# Patient Record
Sex: MALE | Race: WHITE | NOT HISPANIC OR LATINO | Employment: FULL TIME | URBAN - METROPOLITAN AREA
[De-identification: names, ages, dates, MRNs, and addresses within clinical notes are randomized per-mention and may not be internally consistent; named-entity substitution may affect disease eponyms.]

---

## 2018-09-11 ENCOUNTER — CLINICAL SUPPORT (OUTPATIENT)
Dept: OCCUPATIONAL MEDICINE | Facility: CLINIC | Age: 58
End: 2018-09-11

## 2018-09-11 DIAGNOSIS — Z02.1 ENCOUNTER FOR PRE-EMPLOYMENT EXAMINATION: ICD-10-CM

## 2018-09-11 PROCEDURE — 80306 DRUG TEST PRSMV INSTRMNT: CPT | Mod: S$GLB,,, | Performed by: PREVENTIVE MEDICINE

## 2018-09-11 PROCEDURE — 99499 UNLISTED E&M SERVICE: CPT | Mod: S$GLB,,, | Performed by: PREVENTIVE MEDICINE

## 2019-07-19 ENCOUNTER — OCCUPATIONAL HEALTH (OUTPATIENT)
Dept: URGENT CARE | Facility: CLINIC | Age: 59
End: 2019-07-19

## 2019-07-19 DIAGNOSIS — Z02.4 ENCOUNTER FOR DEPARTMENT OF TRANSPORTATION (DOT) EXAMINATION FOR DRIVING LICENSE RENEWAL: Primary | ICD-10-CM

## 2019-07-19 LAB — BREATH ALCOHOL: YES

## 2019-07-19 PROCEDURE — 82075 ASSAY OF BREATH ETHANOL: CPT | Mod: S$GLB,,, | Performed by: NURSE PRACTITIONER

## 2019-07-19 PROCEDURE — 80306 DRUG TEST PRSMV INSTRMNT: CPT | Mod: S$GLB,,, | Performed by: NURSE PRACTITIONER

## 2019-07-19 PROCEDURE — 99499 UNLISTED E&M SERVICE: CPT | Mod: S$GLB,,, | Performed by: NURSE PRACTITIONER

## 2019-07-19 PROCEDURE — 82075 POCT BREATH ALCOHOL TEST: ICD-10-PCS | Mod: S$GLB,,, | Performed by: NURSE PRACTITIONER

## 2019-07-19 PROCEDURE — 80306 OOH DOT DRUG SCREEN: ICD-10-PCS | Mod: S$GLB,,, | Performed by: NURSE PRACTITIONER

## 2019-07-19 PROCEDURE — 99499 DOT PHYSICAL: ICD-10-PCS | Mod: S$GLB,,, | Performed by: NURSE PRACTITIONER

## 2019-12-17 ENCOUNTER — OCCUPATIONAL HEALTH (OUTPATIENT)
Dept: URGENT CARE | Facility: CLINIC | Age: 59
End: 2019-12-17

## 2019-12-17 DIAGNOSIS — Z02.83 ENCOUNTER FOR DRUG SCREENING: Primary | ICD-10-CM

## 2019-12-17 PROCEDURE — 80306 OOH DOT DRUG SCREEN: ICD-10-PCS | Mod: S$GLB,,, | Performed by: PREVENTIVE MEDICINE

## 2019-12-17 PROCEDURE — 80306 DRUG TEST PRSMV INSTRMNT: CPT | Mod: S$GLB,,, | Performed by: PREVENTIVE MEDICINE

## 2021-07-06 ENCOUNTER — OCCUPATIONAL HEALTH (OUTPATIENT)
Dept: URGENT CARE | Facility: CLINIC | Age: 61
End: 2021-07-06

## 2021-07-06 DIAGNOSIS — Z02.4 ENCOUNTER FOR DEPARTMENT OF TRANSPORTATION (DOT) EXAMINATION FOR DRIVING LICENSE RENEWAL: Primary | ICD-10-CM

## 2021-07-06 PROCEDURE — 80306 OOH DOT DRUG SCREEN: ICD-10-PCS | Mod: S$GLB,,, | Performed by: NURSE PRACTITIONER

## 2021-07-06 PROCEDURE — 80306 DRUG TEST PRSMV INSTRMNT: CPT | Mod: S$GLB,,, | Performed by: NURSE PRACTITIONER

## 2021-07-06 PROCEDURE — 99499 UNLISTED E&M SERVICE: CPT | Mod: S$GLB,,, | Performed by: NURSE PRACTITIONER

## 2021-07-06 PROCEDURE — 99499 DOT PHYSICAL: ICD-10-PCS | Mod: S$GLB,,, | Performed by: NURSE PRACTITIONER

## 2021-08-02 ENCOUNTER — OCCUPATIONAL HEALTH (OUTPATIENT)
Dept: URGENT CARE | Facility: CLINIC | Age: 61
End: 2021-08-02

## 2021-08-02 DIAGNOSIS — Z02.89 ENCOUNTER FOR EXAMINATION REQUIRED BY DEPARTMENT OF TRANSPORTATION (DOT): Primary | ICD-10-CM

## 2022-03-17 DIAGNOSIS — T14.90XA TRAUMA: Primary | ICD-10-CM

## 2022-03-17 DIAGNOSIS — S22.21XA FRACTURE OF MANUBRIUM, INITIAL ENCOUNTER FOR CLOSED FRACTURE: ICD-10-CM

## 2022-03-24 ENCOUNTER — CLINICAL SUPPORT (OUTPATIENT)
Dept: REHABILITATION | Facility: HOSPITAL | Age: 62
End: 2022-03-24
Payer: MEDICAID

## 2022-03-24 DIAGNOSIS — M53.84 DECREASED RANGE OF MOTION OF THORACIC SPINE: ICD-10-CM

## 2022-03-24 DIAGNOSIS — F40.298 FEAR OF PAIN: Primary | ICD-10-CM

## 2022-03-24 DIAGNOSIS — R29.3 POSTURE ABNORMALITY: ICD-10-CM

## 2022-03-24 DIAGNOSIS — T78.40XD HYPERSENSITIVITY, SUBSEQUENT ENCOUNTER: ICD-10-CM

## 2022-03-24 DIAGNOSIS — M79.18 MYOFASCIAL MUSCLE PAIN: ICD-10-CM

## 2022-03-24 DIAGNOSIS — R29.898 DECREASED RANGE OF MOTION OF NECK: ICD-10-CM

## 2022-03-24 PROCEDURE — 97162 PT EVAL MOD COMPLEX 30 MIN: CPT | Mod: PO

## 2022-03-24 NOTE — PROGRESS NOTES
"[facility]   Physical Therapy Initial Evaluation     Date: 3/24/2022   [facility]  Cl[facility] Number: [id number]    Therapy Diagnosis:   Encounter Diagnoses   Name Primary?    Posture abnormality     Fear of pain Yes    Decreased range of motion of neck     Decreased range of motion of thoracic spine     Myofascial muscle pain     Hypersensitivity, subsequent encounter      Physician: Sadie Nickerson MD    Physician Orders: PT Eval and Treat   Medical Diagnosis from Referral:   T14.90XA (ICD-10-CM) - Injury, unspecified, initial encounter   S22.21XA (ICD-10-CM) - Fracture of manubrium, initial encounter for closed fracture     Evaluation Date: 3/24/2022  Authorization Period Expiration: 3/28/2023  Plan of Care Expiration: 7/1/2022  Progress Note Due: 4/28/2022  Visit # / Visits authorized: 1/ 1   FOTO: 1/3    Precautions: Standard     Time In: 11:07 am  Time Out: 11:50 am  Total Appointment Time (timed & untimed codes): 43 minutes  **Patient reports that he was given instructions to not get under his truck flat bed. Cannot describe any other precautions provided by physician  **PT will be reaching out to referring providers in neurospinal discipline due to patient's lack of understanding of precautions.    SUBJECTIVE   Date of onset: 11/14/2022  History of current condition - Cheikh reports: he is a  who describes his injury as follows: He was working underneath his truck (which was positioned on a flat bed) in a sitting position when it fell on him, crushing his sternum and breaking his neck in 2 places. He reports that he was "underneath his truck for over 2 hours." He reports however that he does not remember a lot about the accident, and reports that "my head hasn't been right since." He reports that he has not worked since then, and has been confused and disoriented since this incident, including currently as well. He reports that he was given a cervical collar " "at the time of injury but he took it off because it was uncomfortable. He cannot remember when he self-discontinued his cervical collar. He currently denies any parasthesia, referred upper extremity pain, loss of , bowel and bladder disturbances, or history of such complaints.    Imaging, Radiographs: The known fractures involving the right superior articular facet of C7 and right inferior articular facet of C6 are not demonstrated on this radiograph. There is no instability seen on dynamic views.     Prior Therapy: None (*Patient reports that he was very afraid to begin therapy due to a history of a chiropractor "cracking his neck" when he was not prepared, and perseverates on this incident and a reason for extreme caution of physical therapy treatment)  Social History: Lives alone (Occasionally mentioning a significant other and their children living together)  Occupation: ; not currently working)  Prior Level of Function: Independent  Current Level of Function: Unable to work, notes he dresses himself with extreme caution and pain and refuses to move his head. He notes that he is driving at this time.    Pain:  Current 7/10, worst 10/10, best 7/10   Location: bilateral neck , torso  and trunk   Description: Aching, Dull, Burning, Throbbing and Grabbinges  Aggravating Factors: Bending and Dressing, ADL's, Driving but notes that even the smallest movement will "make my head feel that it's on fire."  Easing Factors: rest    Patients goals: To get back to work and to decrease pain.  Medical History:   No past medical history on file.    Surgical History:   Cheikh JAMES Chowdhuryjeaneth  has no past surgical history on file.    Medications:   Cheikh currently has no medications in their medication list.    Allergies:   Review of patient's allergies indicates:  Not on File   OBJECTIVE      Right Left   Biceps Brachii 2+           2+   Brachioradialis 2+           2+   Triceps Brachii 0          0   Patellar Tendon 2+    "       3+   Rayland's Tendon 2+          2+   (-) Hoffmans  (-) Gait Instability  (-) Inverted Supinator Sign    Sensation: Cheikh report normal and symmetrical sensation in bilateral dermatomal levels.     Right Left   Upper Trapezius (C2,3,4)     Shoulder Abduction (C5) 3-/5 (80 degrees) 3-/5 (85 degrees)   Elbow Flexion (C6) 3/5 (unable to hold against any external resistance) 4/5   Elbow Extension (C7) 4/5 (increased pain) 4/5   Wrist Flexion(C7) 4/5 4/5   Wrist Extension (C6) 3-/5 (limited range) 3-/5   Thumb Abduction (C8-T1) 4/5 4/5     Posture/Observation: Cheikh arrives to treatment incredibly guarded and unwilling to rotate his head, rather choosing to rotate his entire body. At rest, he demonstrates marked forward head positioning with upper cervical extension and lower cervical extension, significant kyphosis and abducted bilateral scapula (4.5 inches from spine) with significant depression. There is no evidence of clavicular dislocation or fracture.     Range of Motion/Strength:   CERVICAL AROM Pain/Dysfunction with Movement   Flexion (45-50 degrees) 25 degrees Yes   Extension 5 degrees Yes   Right side bending Not tested Yes   Left side bending Not tested Yes   Right rotation (90 degrees) 5 degrees Yes   Left rotation (90 degrees) 5 degrees Yes       Shoulder Right Left Pain/Dysfunction with Movement   AROM      Flexion (scapular plane)  90 degrees  85 degrees Prefers to raise and lower passively; considerable encouragement to raise and lower independently   Extension  Not tested  Not tested    Abduction  45 degrees  45 degrees Prefers to raise and lower passively; considerable encouragement to raise and lower independently   Adduction  Not tested  Not tested    Internal rotation (Combined Movements)  Not tested  Not tested    ER (Combined Motions) Not tested Not tested    ER at 0° abd  10 degrees  10 degrees Significant pain and guarding     Shoulder Right Left Pain/Dysfunction with Movement   PROM       Flexion (scapular plane) 3-/5  3-/5    Extension  Not tested  Not tested    Abduction  3-/5  3-/5    Adduction  Not tested  Not tested    Internal rotation @ 90 degrees  Not tested  Not tested    ER at 0° abd 3-/5 3-/5    ER at 45° abd  Not tested  Not tested    ER at 90° abd  Not tested  Not tested    **Unable to test patient due to considerable fear and guarding as well as unwillingness to move freely    Limitation/Restriction for FOTO Trunk/Ribs Survey    Therapist reviewed FOTO scores for Cheikh Resendez on 3/24/2022.   FOTO documents entered into [employer] - see Media section.    Limitation Score: 70%       TREATMENT     Total Treatment time (time-based codes) separate from Evaluation: 5 minutes      Cheikh received the treatments listed below:      therapeutic exercises to develop ROM, flexibility and posture for 5 minutes including:  Scapular Retraction, gradual range of motion with each set    PATIENT EDUCATION AND HOME EXERCISES     Education provided:   - -Findings of evaluation and examination, and affect of these on plan for treatment  -Prognosis and expectations  -Role of PT and team-centered care for patient  -Home exercise program and expectations of therapy  -[employer] [facility] and PT/PTA treatment    Written Home Exercises Provided: yes. Exercises were reviewed and Cheikh was able to demonstrate them prior to the end of the session.  Cheikh demonstrated fair  understanding of the education provided. See EMR under Patient Instructions for exercises provided during therapy sessions.    ASSESSMENT     Cheikh is a 61 y.o. male referred to outpatient Physical Therapy with a medical diagnosis of   T14.90XA (ICD-10-CM) - Injury, unspecified, initial encounter   S22.21XA (ICD-10-CM) - Fracture of manubrium, initial encounter for closed fracture   Patient presents with significant participation restrictions as a working , and as in independent community dweller due to limitations in bathing,  dressing, grooming, reaching overhead, working overhead, bending, lifting, carrying, with contributing factors such as marked fear and guarding, unwillingness to move, perseveration of a previous negative experience of therapeutic services, lack of understanding of condition, catastrophization, limited cervical range of motion, limited thoracic range of motion, limited shoulder and scapular range of motion, postural abnormalities and myofascial restrictions. Patient will likely benefit from extensive education and encouragement with each session to progress to prior status and ability to return to work.    Patient prognosis is Guarded.   Patient will benefit from skilled outpatient Physical Therapy to address the deficits stated above and in the chart below, provide patient /family education, and to maximize patientt's level of independence.     Plan of care discussed with patient: Yes  Patient's spiritual, cultural and educational needs considered and patient is agreeable to the plan of care and goals as stated below:     Anticipated Barriers for therapy: Compliance, Level of Understanding, Fear, Guarding, Catastrophizing Behavior    Medical Necessity is demonstrated by the following  History  Co-morbidities and personal factors that may impact the plan of care Co-morbidities:   advanced age, anxiety, coping style/mechanism, depression, difficulty sleeping, education level, financial considerations, level of undertstanding of current condition and poor medication/medical compliance    Personal Factors:   age  education level  coping style  social background  lifestyle  character  attitudes     high   Examination  Body Structures and Functions, activity limitations and participation restrictions that may impact the plan of care Body Regions:   head  neck  back  upper extremities  trunk    Body Systems:    gross symmetry  ROM  strength  gross coordinated movement  transfers  motor control  motor learning  Myofascial  Extensibility, Postural Abnormalities, Joint Accessory Mobility    Participation Restrictions:   Community Dweller      Activity limitations:   Learning and applying knowledge  no deficits    General Tasks and Commands  no deficits    Communication  no deficits    Mobility  lifting and carrying objects  fine hand use (grasping/picking up)  moving around using equipment (WC)  using transportation (bus, train, plane, car)  driving (bike, car, motorcycle)    Self care  washing oneself (bathing, drying, washing hands)  caring for body parts (brushing teeth, shaving, grooming)  toileting  dressing  eating  drinking  looking after one's health    Domestic Life  shopping  cooking  doing house work (cleaning house, washing dishes, laundry)  assisting others    Interactions/Relationships  no deficits    Life Areas  employment    Community and Social Life  community life  recreation and leisure  human rights         high   Clinical Presentation stable and uncomplicated low   Decision Making/ Complexity Score: Moderate     Goals:  Short Term Goals: 4 weeks   1.) Patient will demonstrate independence in compliance and technique of home exercise program provided as per teach-back method of assessment.  2.) Patient will demonstrate fair to good scapular control in repositioning into proper alignment without cueing.  3.) Patient will reach to 120 degrees of shoulder flexion bilaterally to facilitate range of motion for functional ADL performance.  4.) Patient will improve cervical range of motion by +10 degrees in all planes to improve ability to navigate environment.  5.) Patient will report improvement in FOTO score by <60% reported disability.    Long Term Goals: 8 weeks   1.) Patient will demonstrate independence in compliance and technique of home exercise program provided as per teach-back method of assessment.  2.) Patient will demonstrate full thoracic mobility and range of motion in all planes.  3.) Patient will  reach to 170 degrees of shoulder flexion bilaterally to facilitate range of motion for functional ADL performance.  4.) Patient will improve cervical range of motion by +20 degrees in all planes to improve ability to navigate environment.  5.) Patient will report improvement in FOTO score by <40% reported disability.  6.) Patient will lift 5# item overhead for 5x trials with good body mechanics, no fear or guarding and with <3/10 pain.    PLAN   Plan of care Certification: 3/24/2022 to 7/1/2022.    Outpatient Physical Therapy 2 times weekly for 8 weeks to include the following interventions: Manual Therapy, Moist Heat/ Ice, Neuromuscular Re-ed, Patient Education, Self Care, Therapeutic Activities and Therapeutic Exercise.     **PT will be reaching out to referring providers in neurospinal discipline due to patient's lack of understanding of precautions.    Jena Munguia PT, DPT, OCS  License Number: [id number]      I CERTIFY THE NEED FOR THESE SERVICES FURNISHED UNDER THIS PLAN OF TREATMENT AND WHILE UNDER MY CARE   Physician's comments:     Physician's Signature: ___________________________________________________    2-4 weeks

## 2022-03-27 PROBLEM — R29.3 POSTURE ABNORMALITY: Status: ACTIVE | Noted: 2022-03-27
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
## 2022-03-28 ENCOUNTER — DOCUMENTATION ONLY (OUTPATIENT)
Dept: REHABILITATION | Facility: HOSPITAL | Age: 62
End: 2022-03-28
Payer: MEDICAID

## 2022-03-28 NOTE — PROGRESS NOTES
PT called patient after patient had not arrived for 15 minutes after visit was scheduled to begin.   Communication: Spoke with patient, who stated that he had called to cancel due to conflicting appointment and was unable to get through.   Next appointment: 3/29/2022  Patient Reminded/Confirmed: Patient's appointment re-scheduled for this time, patient confirms.  Number of No-Shows To Date: 0  Number of Same-Day Cancels To Date: 1      Jena Munguia PT,DPT, OCS  License Number: [id number]

## 2022-03-29 ENCOUNTER — CLINICAL SUPPORT (OUTPATIENT)
Dept: REHABILITATION | Facility: HOSPITAL | Age: 62
End: 2022-03-29
Payer: MEDICAID

## 2022-03-29 DIAGNOSIS — R29.3 POSTURE ABNORMALITY: Primary | ICD-10-CM

## 2022-03-29 PROCEDURE — 97110 THERAPEUTIC EXERCISES: CPT | Mod: PO
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
## 2022-03-30 NOTE — PROGRESS NOTES
"  Physical Therapy Daily Treatment Note     Name: Cheikh RAMIREZ [facility] Number: [id number]    Therapy Diagnosis:   Encounter Diagnosis   Name Primary?    Posture abnormality Yes     Physician: Sadie Nickerson MD    Visit Date: 3/31/2022  Physician Orders: PT Eval and Treat   Medical Diagnosis from Referral:   T14.90XA (ICD-10-CM) - Injury, unspecified, initial encounter   S22.21XA (ICD-10-CM) - Fracture of manubrium, initial encounter for closed fracture      Evaluation Date: 3/24/2022  Authorization Period Expiration: 6/26/2022  Plan of Care Expiration: 7/1/2022  Progress Note Due: 4/28/2022  Visit # / Visits authorized: 2/14  FOTO: 1/3    Time In: 929am  Time Out: 1014am  Total Billable Time: 44 minutes    Precautions: Standard     Discussed cervical CT results with Dr. Carlos, chief NSGY resident. Patient facet fx healed. Ok to remove c-collar. No follow ups needed at this time. Can return if symptoms change or fail to improve.     Subjective   History of current condition - Cheikh reports: he is a  who describes his injury as follows: He was working underneath his truck (which was positioned on a flat bed) in a sitting position when it fell on him, crushing his sternum and breaking his neck in 2 places. He reports that he was "underneath his truck for over 2 hours." He reports however that he does not remember a lot about the accident, and reports that "my head hasn't been right since." He reports that he has not worked since then, and has been confused and disoriented since this incident, including currently as well. He reports that he was given a cervical collar at the time of injury but he took it off because it was uncomfortable. He cannot remember when he self-discontinued his cervical collar. He currently denies any parasthesia, referred upper extremity pain, loss of , bowel and bladder disturbances, or history of such complaints.     Pt reports: that overall he feels like his mobility and " "pain are both improving. He is doing his exercises at home and feels less stiff after doing them. He still has fear of moving his neck too far.   He was compliant with home exercise program.  Response to previous treatment: Initial evaluation  Functional change: Ongoing    Pain: 9/10 (Despite high levels of pain, patient is able to participate and is in no acute distress)  Location: Bilateral neck    Objective     Cheikh received therapeutic exercises to develop strength, endurance, ROM, flexibility and posture for 30 minutes including:    -Chin-Tucks/Curls, 10x5" hold  -Chin Curl + Chest Press with 0# Juma, 3x8  -Supine Cervical Rotation, gentle, 15x each side  -Scapular Retraction, supine, 20x  -Serratus wall slides within comfortable ROM, 15x  -NuStep, Level 1, 7 minutes, with hot pack on cervical spine while performing    Cheikh received the following manual therapy techniques: Soft tissue Mobilization were applied  for 15 minutes, including:    Upper cervical flexion mobs, grade II  Manually-Assisted Pectoralis Minor Stretch, bilateral, 3x30" hold  Soft-Tissue Massage to (B) Sternocleidomastoid, Longissimus Cervicis/Capitus, Upper Trapezius, Levator Scapulae  Sub-Occipital Release    Cheikh received hot pack for 10 minutes to cervical spine during exercise.    Home Exercises Provided and Patient Education Provided     Education provided:   - Home exercise program  Written Home Exercises Provided: yes.  Exercises were reviewed and Cheikh was able to demonstrate them prior to the end of the session.  Cheikh demonstrated fair  understanding of the education provided.     See EMR under Patient Instructions for exercises provided 3/28/2022.    Assessment     Pt presents to PT with continues reports of pain and stiffness but overall seems to be feeling better since initiating physical therapy. He is tolerating manual interventions well so some gentle upper cervical spine mobilizations were applied today with good " tolerance. He does remain apprehensive with movements of the cervical spine and shoulders but he willing to complete his exercises following education. He was able to achieve about 130 degrees of shoulder flexion during wall slides with no reports of discomfort. Cardiovascular endurance training continues for pain control and improving his overall fitness.     Cheikh Is progressing well towards his goals.   Pt prognosis is Fair.     Pt will continue to benefit from skilled outpatient physical therapy to address the deficits listed in the problem list box on initial evaluation, provide pt/family education and to maximize pt's level of independence in the home and community environment.     Pt's spiritual, cultural and educational needs considered and pt agreeable to plan of care and goals.     Anticipated barriers to physical therapy: Compliance, Level of Understanding, Fear, Guarding, Catastrophizing Behavior    Goals: Short Term Goals: 4 weeks   1.) Patient will demonstrate independence in compliance and technique of home exercise program provided as per teach-back method of assessment.  2.) Patient will demonstrate fair to good scapular control in repositioning into proper alignment without cueing.  3.) Patient will reach to 120 degrees of shoulder flexion bilaterally to facilitate range of motion for functional ADL performance.  4.) Patient will improve cervical range of motion by +10 degrees in all planes to improve ability to navigate environment.  5.) Patient will report improvement in FOTO score by <60% reported disability.     Long Term Goals: 8 weeks   1.) Patient will demonstrate independence in compliance and technique of home exercise program provided as per teach-back method of assessment.  2.) Patient will demonstrate full thoracic mobility and range of motion in all planes.  3.) Patient will reach to 170 degrees of shoulder flexion bilaterally to facilitate range of motion for functional ADL  performance.  4.) Patient will improve cervical range of motion by +20 degrees in all planes to improve ability to navigate environment.  5.) Patient will report improvement in FOTO score by <40% reported disability.  6.) Patient will lift 5# item overhead for 5x trials with good body mechanics, no fear or guarding and with <3/10 pain.  Plan     Continue to progress as per precautions and as per plan of care.  **PT will be reaching out to referring providers in neurospinal discipline due to patient's lack of understanding of precautions.    Terry Saez PT, DPT

## 2022-03-31 ENCOUNTER — CLINICAL SUPPORT (OUTPATIENT)
Dept: REHABILITATION | Facility: HOSPITAL | Age: 62
End: 2022-03-31
Payer: MEDICAID

## 2022-03-31 DIAGNOSIS — R29.3 POSTURE ABNORMALITY: Primary | ICD-10-CM

## 2022-03-31 PROCEDURE — 97110 THERAPEUTIC EXERCISES: CPT | Mod: PO

## 2022-04-04 ENCOUNTER — CLINICAL SUPPORT (OUTPATIENT)
Dept: REHABILITATION | Facility: HOSPITAL | Age: 62
End: 2022-04-04
Payer: MEDICAID

## 2022-04-04 DIAGNOSIS — R29.3 POSTURE ABNORMALITY: Primary | ICD-10-CM

## 2022-04-04 PROCEDURE — 97110 THERAPEUTIC EXERCISES: CPT | Mod: PO

## 2022-04-04 NOTE — PROGRESS NOTES
"  Physical Therapy Daily Treatment Note     [id number]    Therapy Diagnosis:   Encounter Diagnosis   Name Primary?    Posture abnormality Yes     Physician: Sadie Nickerson MD    Visit Date: 4/4/2022  Physician Orders: PT Eval and Treat   Medical Diagnosis from Referral:   T14.90XA (ICD-10-CM) - Injury, unspecified, initial encounter   S22.21XA (ICD-10-CM) - Fracture of manubrium, initial encounter for closed fracture      Evaluation Date: 3/24/2022  Authorization Period Expiration: 6/26/2022  Plan of Care Expiration: 7/1/2022  Progress Note Due: 4/28/2022  Visit # / Visits authorized: 3/14  FOTO: 1/3  Time In: 10:50 am  Time Out: 11:30 am  Total Billable Time: 40 minutes  Precautions: Standard   Discussed cervical CT results with Dr. Carlos, chief NSGY resident. Patient facet fx healed. Ok to remove c-collar. No follow ups needed at this time. Can return if symptoms change or fail to improve.   Subjective   History of current condition - Cheikh reports: he is a  who describes his injury as follows: He was working underneath his truck (which was positioned on a flat bed) in a sitting position when it fell on him, crushing his sternum and breaking his neck in 2 places. He reports that he was "underneath his truck for over 2 hours." He reports however that he does not remember a lot about the accident, and reports that "my head hasn't been right since." He reports that he has not worked since then, and has been confused and disoriented since this incident, including currently as well. He reports that he was given a cervical collar at the time of injury but he took it off because it was uncomfortable. He cannot remember when he self-discontinued his cervical collar. He currently denies any parasthesia, referred upper extremity pain, loss of , bowel and bladder disturbances, or history of such complaints.     Pt reports: he took a muscle relaxer right before our session " "today. He feels that his neck is getting better and he is able to move a little bit more since starting therapy.  He was compliant with home exercise program.  Response to previous treatment: Initial evaluation  Functional change: Ongoing  Pain: 8/10 (Despite high levels of pain, patient is able to participate and is in no acute distress)  Location: Bilateral neck    Objective     Cheikh received therapeutic exercises to develop strength, endurance, ROM, flexibility and posture for 30 minutes including:  -Chin-Tucks/Curls, 20x5" standing, towel behind head for positioning  -Supine Cervical Rotation, gentle, 15x each side  -Scapular Retraction, standing at wall, 2x15, orange band  -Serratus Wall Slides within comfortable ROM, 15x  -NuStep, Level 1, 7 minutes, with hot pack on cervical spine while performing  -Pulleys, scaption, 2 minutes  -Open Books, range of motion to tolerance, 15x (right rotation this visit)    Cheikh received the following manual therapy techniques: Soft tissue Mobilization were applied  for 15 minutes, including:  Upper cervical flexion mobs, grade II  Soft-Tissue Massage to (B) Sternocleidomastoid, Longissimus Cervicis/Capitus, Upper Trapezius, Levator Scapulae   Sub-Occipital Release  Cheikh received hot pack for 7 minutes to cervical spine during exercise.  Home Exercises Provided and Patient Education Provided     Education provided:   - Home exercise program  Written Home Exercises Provided: yes.  Exercises were reviewed and Cheikh was able to demonstrate them prior to the end of the session.  Cheikh demonstrated fair  understanding of the education provided.     See EMR under Patient Instructions for exercises provided 3/28/2022.    Assessment   Cheikh demonstrates a greater willingness to move this visit, with ability to raise arms independently and actively to greater than 90 degrees, and with less caution. Progressed to standing for increased functional tolerance, and patient has difficulty " in scapular retraction with good technique. Tolerated additional exercise well without increased pain.     Cheikh Is progressing well towards his goals.   Pt prognosis is Fair.     Pt will continue to benefit from skilled outpatient physical therapy to address the deficits listed in the problem list box on initial evaluation, provide pt/family education and to maximize pt's level of independence in the home and community environment.     Pt's spiritual, cultural and educational needs considered and pt agreeable to plan of care and goals.     Anticipated barriers to physical therapy: Compliance, Level of Understanding, Fear, Guarding, Catastrophizing Behavior    Goals: Short Term Goals: 4 weeks   1.) Patient will demonstrate independence in compliance and technique of home exercise program provided as per teach-back method of assessment.  2.) Patient will demonstrate fair to good scapular control in repositioning into proper alignment without cueing.  3.) Patient will reach to 120 degrees of shoulder flexion bilaterally to facilitate range of motion for functional ADL performance.  4.) Patient will improve cervical range of motion by +10 degrees in all planes to improve ability to navigate environment.  5.) Patient will report improvement in FOTO score by <60% reported disability.     Long Term Goals: 8 weeks   1.) Patient will demonstrate independence in compliance and technique of home exercise program provided as per teach-back method of assessment.  2.) Patient will demonstrate full thoracic mobility and range of motion in all planes.  3.) Patient will reach to 170 degrees of shoulder flexion bilaterally to facilitate range of motion for functional ADL performance.  4.) Patient will improve cervical range of motion by +20 degrees in all planes to improve ability to navigate environment.  5.) Patient will report improvement in FOTO score by <40% reported disability.  6.) Patient will lift 5# item overhead for 5x  trials with good body mechanics, no fear or guarding and with <3/10 pain.  Plan     Continue to progress as per precautions and as per plan of care.  **PT will be reaching out to referring providers in neurospinal discipline due to patient's lack of understanding of precautions.    Jena Munguia PT, DPT, OCS  License Number: [id number]

## 2022-04-06 ENCOUNTER — TELEPHONE (OUTPATIENT)
Dept: SPORTS MEDICINE | Facility: CLINIC | Age: 62
End: 2022-04-06
Payer: MEDICAID

## 2022-04-06 ENCOUNTER — CLINICAL SUPPORT (OUTPATIENT)
Dept: REHABILITATION | Facility: HOSPITAL | Age: 62
End: 2022-04-06
Payer: MEDICAID

## 2022-04-06 DIAGNOSIS — M25.511 BILATERAL SHOULDER PAIN, UNSPECIFIED CHRONICITY: Primary | ICD-10-CM

## 2022-04-06 DIAGNOSIS — R29.3 POSTURE ABNORMALITY: Primary | ICD-10-CM

## 2022-04-06 DIAGNOSIS — M25.512 BILATERAL SHOULDER PAIN, UNSPECIFIED CHRONICITY: Primary | ICD-10-CM

## 2022-04-06 PROCEDURE — 97110 THERAPEUTIC EXERCISES: CPT | Mod: PO

## 2022-04-06 NOTE — PROGRESS NOTES
"  Physical Therapy Daily Treatment Note     Name: Cheikh RAMIREZ [facility] Number: [id number]    Therapy Diagnosis:   Encounter Diagnosis   Name Primary?    Posture abnormality Yes     Physician: Sadie Nickerson MD    Visit Date: 4/6/2022  Physician Orders: PT Eval and Treat   Medical Diagnosis from Referral:   T14.90XA (ICD-10-CM) - Injury, unspecified, initial encounter   S22.21XA (ICD-10-CM) - Fracture of manubrium, initial encounter for closed fracture      Evaluation Date: 3/24/2022  Authorization Period Expiration: 6/26/2022  Plan of Care Expiration: 7/1/2022  Progress Note Due: 4/28/2022  Visit # / Visits authorized: 4/14  FOTO: 1/3  Time In: 12:20 pm  Time Out: 1:00 pm  Total Billable Time: 40 minutes  Precautions: Standard   Discussed cervical CT results with Dr. Carlos, chief NSGY resident. Patient facet fx healed. Ok to remove c-collar. No follow ups needed at this time. Can return if symptoms change or fail to improve.   Subjective   History of current condition - Cheikh reports: he is a  who describes his injury as follows: He was working underneath his truck (which was positioned on a flat bed) in a sitting position when it fell on him, crushing his sternum and breaking his neck in 2 places. He reports that he was "underneath his truck for over 2 hours." He reports however that he does not remember a lot about the accident, and reports that "my head hasn't been right since." He reports that he has not worked since then, and has been confused and disoriented since this incident, including currently as well. He reports that he was given a cervical collar at the time of injury but he took it off because it was uncomfortable. He cannot remember when he self-discontinued his cervical collar. He currently denies any parasthesia, referred upper extremity pain, loss of , bowel and bladder disturbances, or history of such complaints.     Pt reports: he took a muscle relaxer and a pain pill right " "before our session today, stating that his neck and back are irritated today, possibly from "sleeping on it wrong". He was compliant with home exercise program.  Response to previous treatment: Initial evaluation  Functional change: Ongoing  Pain: 8/10 (Despite high levels of pain, patient is able to participate and is in no acute distress)  Location: Bilateral neck    Objective   Cervical Rotation:   Right: 35 degrees (45 degrees post-manual therapy)   Left: 40 degrees  (55 degrees post-manual therapy)    Cheikh received therapeutic exercises to develop strength, endurance, ROM, flexibility and posture for 15 minutes including:  UBE, standing, Level 1, 2 minutes forward, 2 minutes backward  Prone Scapular Retraction, 2x10, 5" hold  -Chin-Tucks/Curls, 20x5" standing, towel behind head for positioning  -Open Books, range of motion to tolerance, 15x (bilateral)  -Mid-Trapezius Rows, yellow band, 3x10    Cheikh received the following manual therapy techniques: Soft tissue Mobilization were applied  for 25 minutes, including:  Upper Cervical (C1-C2) MET's for rotation; 5x5" holds; bilateral  Prone Thoracic CPA Mobilizations, Grade III, T2-T9    Cheikh received hot pack for 7 minutes to cervical spine during exercise.  Home Exercises Provided and Patient Education Provided     Education provided:   - Home exercise program  Written Home Exercises Provided: yes.  Exercises were reviewed and Cheikh was able to demonstrate them prior to the end of the session.  Cheikh demonstrated fair  understanding of the education provided.     See EMR under Patient Instructions for exercises provided 3/28/2022.    Assessment   Cheikh continues to remain very slow and guarded in his transitions this visit. He demonstrates 35 degrees of right rotation and 40 degrees of left rotation upon arrival, and manual therapy for upper cervical spine commenced to improve initial cervical rotation, which improved by +10 degrees in both directions following " MET's bilaterally. Thoracic hypomobility and thoracic kyphosis noted this visit and was also addressed, albeit no significant improvement with cervical rotation after this visit.    Cheikh Is progressing well towards his goals.   Pt prognosis is Fair.     Pt will continue to benefit from skilled outpatient physical therapy to address the deficits listed in the problem list box on initial evaluation, provide pt/family education and to maximize pt's level of independence in the home and community environment.     Pt's spiritual, cultural and educational needs considered and pt agreeable to plan of care and goals.     Anticipated barriers to physical therapy: Compliance, Level of Understanding, Fear, Guarding, Catastrophizing Behavior    Goals: Short Term Goals: 4 weeks   1.) Patient will demonstrate independence in compliance and technique of home exercise program provided as per teach-back method of assessment.  2.) Patient will demonstrate fair to good scapular control in repositioning into proper alignment without cueing.  3.) Patient will reach to 120 degrees of shoulder flexion bilaterally to facilitate range of motion for functional ADL performance.  4.) Patient will improve cervical range of motion by +10 degrees in all planes to improve ability to navigate environment.  5.) Patient will report improvement in FOTO score by <60% reported disability.     Long Term Goals: 8 weeks   1.) Patient will demonstrate independence in compliance and technique of home exercise program provided as per teach-back method of assessment.  2.) Patient will demonstrate full thoracic mobility and range of motion in all planes.  3.) Patient will reach to 170 degrees of shoulder flexion bilaterally to facilitate range of motion for functional ADL performance.  4.) Patient will improve cervical range of motion by +20 degrees in all planes to improve ability to navigate environment.  5.) Patient will report improvement in FOTO score by  <40% reported disability.  6.) Patient will lift 5# item overhead for 5x trials with good body mechanics, no fear or guarding and with <3/10 pain.  Plan     Continue to progress as per precautions and as per plan of care.  **PT will be reaching out to referring providers in neurospinal discipline due to patient's lack of understanding of precautions.    Jena Munguia PT, DPT, OCS  License Number: [id number]

## 2022-04-06 NOTE — TELEPHONE ENCOUNTER
Called pt to see which joint laterality it was and to let him know he needs to come early to get xrays done before his appt.

## 2022-04-08 ENCOUNTER — OFFICE VISIT (OUTPATIENT)
Dept: SPORTS MEDICINE | Facility: CLINIC | Age: 62
End: 2022-04-08
Payer: MEDICAID

## 2022-04-08 VITALS
HEIGHT: 69 IN | DIASTOLIC BLOOD PRESSURE: 91 MMHG | BODY MASS INDEX: 28.29 KG/M2 | SYSTOLIC BLOOD PRESSURE: 157 MMHG | WEIGHT: 191 LBS | HEART RATE: 78 BPM

## 2022-04-08 DIAGNOSIS — M25.511 ACUTE PAIN OF RIGHT SHOULDER: ICD-10-CM

## 2022-04-08 DIAGNOSIS — S49.91XA TRAUMATIC INJURY OF RIGHT SHOULDER: Primary | ICD-10-CM

## 2022-04-08 DIAGNOSIS — Z98.890 S/P RIGHT ROTATOR CUFF REPAIR: ICD-10-CM

## 2022-04-08 PROCEDURE — 3077F SYST BP >= 140 MM HG: CPT | Mod: CPTII,,, | Performed by: ORTHOPAEDIC SURGERY

## 2022-04-08 PROCEDURE — 3080F PR MOST RECENT DIASTOLIC BLOOD PRESSURE >= 90 MM HG: ICD-10-PCS | Mod: CPTII,,, | Performed by: ORTHOPAEDIC SURGERY

## 2022-04-08 PROCEDURE — 1159F PR MEDICATION LIST DOCUMENTED IN MEDICAL RECORD: ICD-10-PCS | Mod: CPTII,,, | Performed by: ORTHOPAEDIC SURGERY

## 2022-04-08 PROCEDURE — 99999 PR PBB SHADOW E&M-EST. PATIENT-LVL IV: CPT | Mod: PBBFAC,,, | Performed by: ORTHOPAEDIC SURGERY

## 2022-04-08 PROCEDURE — 3008F PR BODY MASS INDEX (BMI) DOCUMENTED: ICD-10-PCS | Mod: CPTII,,, | Performed by: ORTHOPAEDIC SURGERY

## 2022-04-08 PROCEDURE — 99204 PR OFFICE/OUTPT VISIT, NEW, LEVL IV, 45-59 MIN: ICD-10-PCS | Mod: S$PBB,,, | Performed by: ORTHOPAEDIC SURGERY

## 2022-04-08 PROCEDURE — 99204 OFFICE O/P NEW MOD 45 MIN: CPT | Mod: S$PBB,,, | Performed by: ORTHOPAEDIC SURGERY

## 2022-04-08 PROCEDURE — 3077F PR MOST RECENT SYSTOLIC BLOOD PRESSURE >= 140 MM HG: ICD-10-PCS | Mod: CPTII,,, | Performed by: ORTHOPAEDIC SURGERY

## 2022-04-08 PROCEDURE — 1159F MED LIST DOCD IN RCRD: CPT | Mod: CPTII,,, | Performed by: ORTHOPAEDIC SURGERY

## 2022-04-08 PROCEDURE — 3080F DIAST BP >= 90 MM HG: CPT | Mod: CPTII,,, | Performed by: ORTHOPAEDIC SURGERY

## 2022-04-08 PROCEDURE — 4010F ACE/ARB THERAPY RXD/TAKEN: CPT | Mod: CPTII,,, | Performed by: ORTHOPAEDIC SURGERY

## 2022-04-08 PROCEDURE — 3008F BODY MASS INDEX DOCD: CPT | Mod: CPTII,,, | Performed by: ORTHOPAEDIC SURGERY

## 2022-04-08 PROCEDURE — 99214 OFFICE O/P EST MOD 30 MIN: CPT | Mod: PBBFAC,PN | Performed by: ORTHOPAEDIC SURGERY

## 2022-04-08 PROCEDURE — 1160F RVW MEDS BY RX/DR IN RCRD: CPT | Mod: CPTII,,, | Performed by: ORTHOPAEDIC SURGERY

## 2022-04-08 PROCEDURE — 4010F PR ACE/ARB THEARPY RXD/TAKEN: ICD-10-PCS | Mod: CPTII,,, | Performed by: ORTHOPAEDIC SURGERY

## 2022-04-08 PROCEDURE — 1160F PR REVIEW ALL MEDS BY PRESCRIBER/CLIN PHARMACIST DOCUMENTED: ICD-10-PCS | Mod: CPTII,,, | Performed by: ORTHOPAEDIC SURGERY

## 2022-04-08 PROCEDURE — 99999 PR PBB SHADOW E&M-EST. PATIENT-LVL IV: ICD-10-PCS | Mod: PBBFAC,,, | Performed by: ORTHOPAEDIC SURGERY

## 2022-04-08 RX ORDER — GABAPENTIN 600 MG/1
600 TABLET ORAL 3 TIMES DAILY
COMMUNITY
Start: 2022-03-22

## 2022-04-08 RX ORDER — BUTALBITAL, ACETAMINOPHEN AND CAFFEINE 50; 325; 40 MG/1; MG/1; MG/1
1 TABLET ORAL EVERY 4 HOURS PRN
COMMUNITY
Start: 2022-04-04

## 2022-04-08 RX ORDER — DICLOFENAC SODIUM 75 MG/1
75 TABLET, DELAYED RELEASE ORAL 2 TIMES DAILY PRN
COMMUNITY
Start: 2022-04-05

## 2022-04-08 RX ORDER — VALACYCLOVIR HYDROCHLORIDE 1 G/1
TABLET, FILM COATED ORAL
COMMUNITY
Start: 2022-03-22

## 2022-04-08 RX ORDER — TESTOSTERONE 50 MG/5G
GEL TRANSDERMAL
COMMUNITY
Start: 2022-03-22

## 2022-04-08 RX ORDER — LOSARTAN POTASSIUM 50 MG/1
50 TABLET ORAL DAILY
COMMUNITY
Start: 2022-04-05

## 2022-04-08 RX ORDER — TADALAFIL 10 MG/1
TABLET ORAL
COMMUNITY
Start: 2022-03-31

## 2022-04-08 RX ORDER — LIDOCAINE 50 MG/G
PATCH TOPICAL
COMMUNITY

## 2022-04-08 RX ORDER — SILDENAFIL CITRATE 20 MG/1
TABLET ORAL
COMMUNITY

## 2022-04-08 RX ORDER — CYANOCOBALAMIN 1000 UG/ML
INJECTION, SOLUTION INTRAMUSCULAR; SUBCUTANEOUS
COMMUNITY
Start: 2022-03-22

## 2022-04-08 RX ORDER — OXYCODONE HYDROCHLORIDE 10 MG/1
TABLET ORAL
COMMUNITY
Start: 2021-11-15

## 2022-04-08 RX ORDER — CARISOPRODOL 350 MG/1
350 TABLET ORAL 4 TIMES DAILY PRN
COMMUNITY
End: 2022-11-18

## 2022-04-08 NOTE — PROGRESS NOTES
Subjective:          Chief Complaint: Cheikh Resendez is a 61 y.o. male who had concerns including Pain of the Right Shoulder.    HPI    Patient who is RHD, surgical hx of bilateral RC repair and works as a  presents to clinic with right shoulder pain x 5 months. Patient states last November he was working underneath a truck when the roberto carlos propping the the truck up disengaged causing the truck to fall on his chest and right shoulder.  Patient visited the emergency department immediately afterwards and received multiple x-rays which revealed fractures at C6, C7, as well as the manubrium.  He was cleared by orthopedics, placed in C-collar, and appointments with neuro surgery and an order for cervical MRI were placed.  He has dealt with the pain in the lateral aspect of his shoulder since this event.  He has been going to physical therapy for his neck which is also included some exercises involving the shoulder that have not helped.  He rates the pain as 7/10, is made worse with any shoulder movement  He has attempted multiple conservative measures that include activity modification, ice & elevation, and oral medications (oxycodone and gabapentin), that have not relieved the pain. He denies any mechanical symptoms to include locking and catching or instability.  Is affecting ADLs and limiting desired level of activity, as he has been unable to work as a  since the injury. Denies numbness, tingling, radiation, and inability to bear weight. He is here today to discuss treatment options.    SSV%: 50%    Surgical history:     ROTATOR CUFF REPAIR Left 1998    ROTATOR CUFF REPAIR Right 1998    ROTATOR CUFF REPAIR W/ DISTAL CLAVICLE EXCISION Left 1998    SHOULDER ARTHROSCOPY Bilateral 2009           Review of Systems   Constitutional: Negative.   HENT: Negative.    Eyes: Negative.    Cardiovascular: Negative.    Respiratory: Negative.    Endocrine: Negative.    Hematologic/Lymphatic: Negative.    Skin:  Negative.    Musculoskeletal: Positive for back pain, joint pain, joint swelling, neck pain and stiffness. Negative for arthritis and gout.   Neurological: Negative.    Psychiatric/Behavioral: Negative.    Allergic/Immunologic: Negative.                    Objective:        General: Cheikh is well-developed, well-nourished, appears stated age, in no acute distress, alert and oriented to time, place and person.     General    Nursing note and vitals reviewed.  Constitutional: He is oriented to person, place, and time. He appears well-developed and well-nourished. No distress.   HENT:   Head: Normocephalic and atraumatic.   Nose: Nose normal.   Eyes: EOM are normal.   Cardiovascular: Intact distal pulses.    Pulmonary/Chest: Effort normal. No respiratory distress.   Neurological: He is alert and oriented to person, place, and time.   Psychiatric: He has a normal mood and affect. His behavior is normal. Judgment and thought content normal.         Right Shoulder Exam     Inspection/Observation   Swelling: absent  Bruising: absent  Scars: absent  Deformity: absent  Scapular Winging: absent  Scapular Dyskinesia: negative  Atrophy: absent    Tenderness   The patient is tender to palpation of the acromioclavicular joint, biceps tendon and supraspinatus.    Range of Motion   Active abduction: 120   Passive abduction: 140   Forward Flexion: 140   External Rotation 0 degrees: 60   Internal rotation 0 degrees: Lumbar     Tests & Signs   Drop arm: negative  Salinas test: negative  Impingement: positive  Rotator Cuff Painful Arc/Range: moderate  Lift Off Sign: negative  Belly Press: negative  Active Compression Test (Johnston's Sign): negative  Speed's Test: positive  Bear Hug: negative    Other   Sensation: normal    Left Shoulder Exam   Left shoulder exam is normal.    Inspection/Observation   Swelling: absent  Bruising: absent  Scars: absent  Deformity: absent  Scapular Winging: absent  Scapular Dyskinesia: negative  Atrophy:  absent    Tenderness   The patient is experiencing no tenderness.     Range of Motion   Active abduction: 170   Passive abduction: 170   Forward Flexion: 180   External Rotation 0 degrees: 60   Internal rotation 0 degrees: Lumbar     Other   Sensation: normal       Muscle Strength   Right Upper Extremity   Shoulder Abduction: 5/5   Shoulder Internal Rotation: 5/5   Shoulder External Rotation: 4/5   Supraspinatus: 4/5   Subscapularis: 4/5   Biceps: 4/5   Left Upper Extremity  Shoulder Abduction: 5/5   Shoulder Internal Rotation: 5/5   Shoulder External Rotation: 5/5   Supraspinatus: 5/5   Subscapularis: 5/5   Biceps: 5/5     Vascular Exam     Right Pulses      Radial:                    2+      Left Pulses      Radial:                    2+      Capillary Refill  Right Hand: normal capillary refill  Left Hand: normal capillary refill        Radiographs right shoulder    My interpretation:    Prominent widening of the AC joint; possible postsurgical changes from AC joint resection    Mild glenohumeral arthritis        Assessment:       Encounter Diagnoses   Name Primary?    S/P right rotator cuff repair     Acute pain of right shoulder     Traumatic injury of right shoulder Yes          Plan:       1. I made the decision to order new imaging of the extremity or extremities evaluated. I independently reviewed and interpreted the radiographs and/or MRIs today. These images were shown to the patient where I then discussed my findings in detail.    2. We discussed at length different treatment options including conservative vs surgical management. These include anti-inflammatories, acetaminophen, rest, ice, heat, formal physical therapy including strengthening and stretching exercises, home exercise programs, dry needling, and finally surgical intervention. Due to the severe level of pain as well as the fact it has not subsided with physical therapy, I believe further imaging to include MRI is warranted at this  time    3. Future MRI of right shoulder without contrast scheduled.    4. RTC to see Giovany Barajas PA-C in 2 weeks for MRI results review.  Will discuss findings with patient's and determine if patient may benefit from physical therapy or if operative management is warranted.      All of the patient's questions were answered. Patient was advised to call the clinic or contact me through the patient portal for any questions or concerns.                     Patient questionnaires may have been collected.

## 2022-04-11 ENCOUNTER — CLINICAL SUPPORT (OUTPATIENT)
Dept: REHABILITATION | Facility: HOSPITAL | Age: 62
End: 2022-04-11
Payer: MEDICAID

## 2022-04-11 DIAGNOSIS — R29.3 POSTURE ABNORMALITY: Primary | ICD-10-CM

## 2022-04-11 PROCEDURE — 97110 THERAPEUTIC EXERCISES: CPT | Mod: PO

## 2022-04-11 NOTE — PROGRESS NOTES
"  Physical Therapy Daily Treatment Note     [id number]    Therapy Diagnosis:   Encounter Diagnosis   Name Primary?    Posture abnormality Yes     Physician: Sadie Nickerson MD    Visit Date: 4/11/2022  Physician Orders: PT Eval and Treat   Medical Diagnosis from Referral:   T14.90XA (ICD-10-CM) - Injury, unspecified, initial encounter   S22.21XA (ICD-10-CM) - Fracture of manubrium, initial encounter for closed fracture      Evaluation Date: 3/24/2022  Authorization Period Expiration: 6/26/2022  Plan of Care Expiration: 7/1/2022  Progress Note Due: 4/28/2022  Visit # / Visits authorized: 5/14  FOTO: 1/3  Time In: 10:00 am  Time Out: 10:45 am  Total Billable Time: 40 minutes  Precautions: Standard   Discussed cervical CT results with Dr. Carlos, chief NSGY resident. Patient facet fx healed. Ok to remove c-collar. No follow ups needed at this time. Can return if symptoms change or fail to improve.   Subjective   History of current condition - Cheikh reports: he is a  who describes his injury as follows: He was working underneath his truck (which was positioned on a flat bed) in a sitting position when it fell on him, crushing his sternum and breaking his neck in 2 places. He reports that he was "underneath his truck for over 2 hours." He reports however that he does not remember a lot about the accident, and reports that "my head hasn't been right since." He reports that he has not worked since then, and has been confused and disoriented since this incident, including currently as well. He reports that he was given a cervical collar at the time of injury but he took it off because it was uncomfortable. He cannot remember when he self-discontinued his cervical collar. He currently denies any parasthesia, referred upper extremity pain, loss of , bowel and bladder disturbances, or history of such complaints.     Pt reports: he feels that he is able to turn his head with " "more motion, but continues to feel "burning" with movement because "the nerves must be plugged into the receptors" . He was compliant with home exercise program.  Response to previous treatment: No adverse effect  Functional change: Improved cervical rotation  Pain: 8/10 (Despite high levels of pain, patient is able to participate and is in no acute distress)  Location: Bilateral neck    Objective   Cervical Rotation:   Right: 45 degrees (50 degrees post-manual therapy)   Left: 50 degrees  (55 degrees post-manual therapy)    Cheikh received therapeutic exercises to develop strength, endurance, ROM, flexibility and posture for 32 minutes including:  UBE, standing, Level 1, 2 minutes forward, 2 minutes backward  Foam Roll Swimmers, supine, 15x each side  Foam Roll Pectoralis Major Y's (sternal fibers), 15x5" hold  -Chin-Tucks/Curls, 20x5" standing, towel behind head for positioning, on foam roller  -Bilateral E.R. with GH Elevation, orange band around wrists, 15x  -Mid-Trapezius Rows, green band, 3x10    Cheikh received the following manual therapy techniques: Soft tissue Mobilization were applied  for 8 minutes, including:  Prone Thoracic CPA Mobilizations, Grade III, T2-T9  C1-C2 Gentle Mobilizations, prone, Grade II-III    Home Exercises Provided and Patient Education Provided     Education provided:   - Home exercise program  Written Home Exercises Provided: yes.  Exercises were reviewed and Cheikh was able to demonstrate them prior to the end of the session.  Cheikh demonstrated fair  understanding of the education provided.     See EMR under Patient Instructions for exercises provided 3/28/2022.    Assessment   Cheikh demonstrates much less fear with his transitional movements. He demonstrates 45 degrees of right rotation and 50 degrees of left rotation upon arrival, and manual therapy for thoracic spine and upper cervical spine commenced to improve initial cervical rotation, which improved by +5 degrees in both " directions this visit. Tolerated progressions, although noted impaired left side shoulder external rotation during exercises and this may be assessed at a later time.    Cheikh Is progressing well towards his goals.   Pt prognosis is Fair.     Pt will continue to benefit from skilled outpatient physical therapy to address the deficits listed in the problem list box on initial evaluation, provide pt/family education and to maximize pt's level of independence in the home and community environment.     Pt's spiritual, cultural and educational needs considered and pt agreeable to plan of care and goals.     Anticipated barriers to physical therapy: Compliance, Level of Understanding, Fear, Guarding, Catastrophizing Behavior    Goals: Short Term Goals: 4 weeks   1.) Patient will demonstrate independence in compliance and technique of home exercise program provided as per teach-back method of assessment.  2.) Patient will demonstrate fair to good scapular control in repositioning into proper alignment without cueing.  3.) Patient will reach to 120 degrees of shoulder flexion bilaterally to facilitate range of motion for functional ADL performance.  4.) Patient will improve cervical range of motion by +10 degrees in all planes to improve ability to navigate environment.  5.) Patient will report improvement in FOTO score by <60% reported disability.     Long Term Goals: 8 weeks   1.) Patient will demonstrate independence in compliance and technique of home exercise program provided as per teach-back method of assessment.  2.) Patient will demonstrate full thoracic mobility and range of motion in all planes.  3.) Patient will reach to 170 degrees of shoulder flexion bilaterally to facilitate range of motion for functional ADL performance.  4.) Patient will improve cervical range of motion by +20 degrees in all planes to improve ability to navigate environment.  5.) Patient will report improvement in FOTO score by <40%  reported disability.  6.) Patient will lift 5# item overhead for 5x trials with good body mechanics, no fear or guarding and with <3/10 pain.  Plan     Continue to progress as per precautions and as per plan of care.  **PT will be reaching out to referring providers in neurospinal discipline due to patient's lack of understanding of precautions.    Jena Munguia PT, DPT, OCS  License Number: [id number]

## 2022-04-11 NOTE — PROGRESS NOTES
"  Physical Therapy Daily Treatment Note     [facility]  [facility] Number: [id number]    Therapy Diagnosis:   No diagnosis found.  Physician: Sadie Nickerson MD    Visit Date: 4/11/2022  Physician Orders: PT Eval and Treat   Medical Diagnosis from Referral:   T14.90XA (ICD-10-CM) - Injury, unspecified, initial encounter   S22.21XA (ICD-10-CM) - Fracture of manubrium, initial encounter for closed fracture      Evaluation Date: 3/24/2022  Authorization Period Expiration: 6/26/2022  Plan of Care Expiration: 7/1/2022  Progress Note Due: 4/28/2022  Visit # / Visits authorized: 5/14  FOTO: 1/3    Time In: ***  Time Out: ***  Total Billable Time: *** minutes    Precautions: Standard     Discussed cervical CT results with Dr. Carlos, chief NSGY resident. Patient facet fx healed. Ok to remove c-collar. No follow ups needed at this time. Can return if symptoms change or fail to improve.     Subjective   History of current condition - Cheikh reports: he is a  who describes his injury as follows: He was working underneath his truck (which was positioned on a flat bed) in a sitting position when it fell on him, crushing his sternum and breaking his neck in 2 places. He reports that he was "underneath his truck for over 2 hours." He reports however that he does not remember a lot about the accident, and reports that "my head hasn't been right since." He reports that he has not worked since then, and has been confused and disoriented since this incident, including currently as well. He reports that he was given a cervical collar at the time of injury but he took it off because it was uncomfortable. He cannot remember when he self-discontinued his cervical collar. He currently denies any parasthesia, referred upper extremity pain, loss of , bowel and bladder disturbances, or history of such complaints.     Pt reports: he took a muscle relaxer and a pain pill right before our session today, stating that his neck " "and back are irritated today, possibly from "sleeping on it wrong". He was compliant with home exercise program.  Response to previous treatment: Initial evaluation  Functional change: Ongoing  Pain: 8/10 (Despite high levels of pain, patient is able to participate and is in no acute distress)  Location: Bilateral neck    Objective   Cervical Rotation:   Right: 35 degrees (45 degrees post-manual therapy)   Left: 40 degrees  (55 degrees post-manual therapy)    Cheikh received therapeutic exercises to develop strength, endurance, ROM, flexibility and posture for 15 minutes including:  UBE, standing, Level 1, 2 minutes forward, 2 minutes backward  Prone Scapular Retraction, 2x10, 5" hold  -Chin-Tucks/Curls, 20x5" standing, towel behind head for positioning  -Open Books, range of motion to tolerance, 15x (bilateral)  -Mid-Trapezius Rows, yellow band, 3x10    Cheikh received the following manual therapy techniques: Soft tissue Mobilization were applied  for 25 minutes, including:  Upper Cervical (C1-C2) MET's for rotation; 5x5" holds; bilateral  Prone Thoracic CPA Mobilizations, Grade III, T2-T9    Cheikh received hot pack for 7 minutes to cervical spine during exercise.  Home Exercises Provided and Patient Education Provided     Education provided:   - Home exercise program  Written Home Exercises Provided: yes.  Exercises were reviewed and Cheikh was able to demonstrate them prior to the end of the session.  Cheikh demonstrated fair  understanding of the education provided.     See EMR under Patient Instructions for exercises provided 3/28/2022.    Assessment   Cheikh continues to remain very slow and guarded in his transitions this visit. He demonstrates 35 degrees of right rotation and 40 degrees of left rotation upon arrival, and manual therapy for upper cervical spine commenced to improve initial cervical rotation, which improved by +10 degrees in both directions following MET's bilaterally. Thoracic hypomobility and " thoracic kyphosis noted this visit and was also addressed, albeit no significant improvement with cervical rotation after this visit.    Cheikh Is progressing well towards his goals.   Pt prognosis is Fair.     Pt will continue to benefit from skilled outpatient physical therapy to address the deficits listed in the problem list box on initial evaluation, provide pt/family education and to maximize pt's level of independence in the home and community environment.     Pt's spiritual, cultural and educational needs considered and pt agreeable to plan of care and goals.     Anticipated barriers to physical therapy: Compliance, Level of Understanding, Fear, Guarding, Catastrophizing Behavior    Goals: Short Term Goals: 4 weeks   1.) Patient will demonstrate independence in compliance and technique of home exercise program provided as per teach-back method of assessment.  2.) Patient will demonstrate fair to good scapular control in repositioning into proper alignment without cueing.  3.) Patient will reach to 120 degrees of shoulder flexion bilaterally to facilitate range of motion for functional ADL performance.  4.) Patient will improve cervical range of motion by +10 degrees in all planes to improve ability to navigate environment.  5.) Patient will report improvement in FOTO score by <60% reported disability.     Long Term Goals: 8 weeks   1.) Patient will demonstrate independence in compliance and technique of home exercise program provided as per teach-back method of assessment.  2.) Patient will demonstrate full thoracic mobility and range of motion in all planes.  3.) Patient will reach to 170 degrees of shoulder flexion bilaterally to facilitate range of motion for functional ADL performance.  4.) Patient will improve cervical range of motion by +20 degrees in all planes to improve ability to navigate environment.  5.) Patient will report improvement in FOTO score by <40% reported disability.  6.) Patient will  lift 5# item overhead for 5x trials with good body mechanics, no fear or guarding and with <3/10 pain.  Plan     Continue to progress as per precautions and as per plan of care.  **PT will be reaching out to referring providers in neurospinal discipline due to patient's lack of understanding of precautions.    Terry Saez PT, DPT

## 2022-04-13 ENCOUNTER — CLINICAL SUPPORT (OUTPATIENT)
Dept: REHABILITATION | Facility: HOSPITAL | Age: 62
End: 2022-04-13
Payer: MEDICAID

## 2022-04-13 DIAGNOSIS — R29.3 POSTURE ABNORMALITY: Primary | ICD-10-CM

## 2022-04-13 PROCEDURE — 97110 THERAPEUTIC EXERCISES: CPT | Mod: PO

## 2022-04-13 NOTE — PROGRESS NOTES
"  Physical Therapy Daily Treatment Note     Name: Cheikh RAMIREZ [facility] Number: [id number]    Therapy Diagnosis:   Encounter Diagnosis   Name Primary?    Posture abnormality Yes     Physician: Sadie Nickerson MD    Visit Date: 4/13/2022  Physician Orders: PT Eval and Treat   Medical Diagnosis from Referral:   T14.90XA (ICD-10-CM) - Injury, unspecified, initial encounter   S22.21XA (ICD-10-CM) - Fracture of manubrium, initial encounter for closed fracture      Evaluation Date: 3/24/2022  Authorization Period Expiration: 6/26/2022  Plan of Care Expiration: 7/1/2022  Progress Note Due: 4/28/2022  Visit # / Visits authorized: 5/14  FOTO: 1/3  Time In: 11:35 am  Time Out: 12:15 pm  Total Billable Time: 40 minutes  Precautions: Standard   Discussed cervical CT results with Dr. Carlos, chief NSGY resident. Patient facet fx healed. Ok to remove c-collar. No follow ups needed at this time. Can return if symptoms change or fail to improve.   Subjective   History of current condition - Cheikh reports: he is a  who describes his injury as follows: He was working underneath his truck (which was positioned on a flat bed) in a sitting position when it fell on him, crushing his sternum and breaking his neck in 2 places. He reports that he was "underneath his truck for over 2 hours." He reports however that he does not remember a lot about the accident, and reports that "my head hasn't been right since." He reports that he has not worked since then, and has been confused and disoriented since this incident, including currently as well. He reports that he was given a cervical collar at the time of injury but he took it off because it was uncomfortable. He cannot remember when he self-discontinued his cervical collar. He currently denies any parasthesia, referred upper extremity pain, loss of , bowel and bladder disturbances, or history of such complaints.     Pt reports: he is feeling good and "getting stronger " "everyday."  He was compliant with home exercise program.  Response to previous treatment: No adverse effect  Functional change: Improved cervical rotation  Pain:  (Despite high levels of pain, patient is able to participate and is in no acute distress)  Location: Bilateral neck    Objective     Cheikh received therapeutic exercises to develop strength, endurance, ROM, flexibility and posture for 25 minutes including:  UBE, standing, Level 2, 3 minutes forward, 3 minutes backward  Open Books, 15x5" hold, bilaterally  Middle Trapezius Horizontal Abduction, 3# pulley, with opposite serratus press, 10x bilateral  Ball Up Wall, 4# Physioball, cervical neutral position  Pec Horizontal Adduction, 3# each UE, cable machine, 2x10    Cheikh received the following manual therapy techniques: Soft tissue Mobilization were applied  For 15 minutes, including:  Prone Thoracic CPA Mobilizations, Grade V, T2-T9  1st Rib Mobilizations (bilateral)  Anterior to Posterior 2nd-5th Rib Mobilizations, Grade III, bilateral  Grade II-III Sternoclavicular Inferior Mobilizations, bilateral    Home Exercises Provided and Patient Education Provided     Education provided:   - Home exercise program  Written Home Exercises Provided: yes.  Exercises were reviewed and Cheikh was able to demonstrate them prior to the end of the session.  Cheikh demonstrated fair  understanding of the education provided.     See EMR under Patient Instructions for exercises provided 3/28/2022.    Assessment   Cheikh demonstrates fairly good mobility of bilateral first ribs, without evidence of marked elevation. He demonstrates no pain and fair mobility of bilateral sternoclavicular joints, with impaired joint accessory mobility of his upper ribs. Exercises progressed for more functional range of motion and strength which the patient performed without any increased pain.    Cheikh Is progressing well towards his goals.   Pt prognosis is Fair.     Pt will continue to benefit " from skilled outpatient physical therapy to address the deficits listed in the problem list box on initial evaluation, provide pt/family education and to maximize pt's level of independence in the home and community environment.     Pt's spiritual, cultural and educational needs considered and pt agreeable to plan of care and goals.     Anticipated barriers to physical therapy: Compliance, Level of Understanding, Fear, Guarding, Catastrophizing Behavior    Goals: Short Term Goals: 4 weeks   1.) Patient will demonstrate independence in compliance and technique of home exercise program provided as per teach-back method of assessment.  2.) Patient will demonstrate fair to good scapular control in repositioning into proper alignment without cueing.  3.) Patient will reach to 120 degrees of shoulder flexion bilaterally to facilitate range of motion for functional ADL performance.  4.) Patient will improve cervical range of motion by +10 degrees in all planes to improve ability to navigate environment.  5.) Patient will report improvement in FOTO score by <60% reported disability.     Long Term Goals: 8 weeks   1.) Patient will demonstrate independence in compliance and technique of home exercise program provided as per teach-back method of assessment.  2.) Patient will demonstrate full thoracic mobility and range of motion in all planes.  3.) Patient will reach to 170 degrees of shoulder flexion bilaterally to facilitate range of motion for functional ADL performance.  4.) Patient will improve cervical range of motion by +20 degrees in all planes to improve ability to navigate environment.  5.) Patient will report improvement in FOTO score by <40% reported disability.  6.) Patient will lift 5# item overhead for 5x trials with good body mechanics, no fear or guarding and with <3/10 pain.  Plan     Continue to progress as per precautions and as per plan of care.  **PT will be reaching out to referring providers in  neurospinal discipline due to patient's lack of understanding of precautions.    Jena Munguia PT, DPT, OCS  License Number: [id number]

## 2022-04-18 ENCOUNTER — CLINICAL SUPPORT (OUTPATIENT)
Dept: REHABILITATION | Facility: HOSPITAL | Age: 62
End: 2022-04-18
Payer: MEDICAID

## 2022-04-18 DIAGNOSIS — F40.298 FEAR OF PAIN: ICD-10-CM

## 2022-04-18 DIAGNOSIS — R29.898 DECREASED RANGE OF MOTION OF NECK: ICD-10-CM

## 2022-04-18 DIAGNOSIS — M79.18 MYOFASCIAL MUSCLE PAIN: ICD-10-CM

## 2022-04-18 DIAGNOSIS — R29.3 POSTURE ABNORMALITY: Primary | ICD-10-CM

## 2022-04-18 DIAGNOSIS — S22.21XA FRACTURE OF MANUBRIUM, INITIAL ENCOUNTER FOR CLOSED FRACTURE: ICD-10-CM

## 2022-04-18 DIAGNOSIS — M53.84 DECREASED RANGE OF MOTION OF THORACIC SPINE: ICD-10-CM

## 2022-04-18 DIAGNOSIS — T78.40XD HYPERSENSITIVITY, SUBSEQUENT ENCOUNTER: ICD-10-CM

## 2022-04-18 PROCEDURE — 97110 THERAPEUTIC EXERCISES: CPT | Mod: PO

## 2022-04-18 NOTE — PROGRESS NOTES
"  Physical Therapy Daily Treatment Note     Name: Cheikh Resendez  Clinic Number: [id number]    Therapy Diagnosis:   Encounter Diagnoses   Name Primary?    Posture abnormality Yes    Fear of pain     Decreased range of motion of neck     Decreased range of motion of thoracic spine     Myofascial muscle pain     Hypersensitivity, subsequent encounter     Fracture of manubrium, initial encounter for closed fracture      Physician: Sadie Nickerson MD    Visit Date: 4/18/2022  Physician Orders: PT Eval and Treat   Medical Diagnosis from Referral:   T14.90XA (ICD-10-CM) - Injury, unspecified, initial encounter   S22.21XA (ICD-10-CM) - Fracture of manubrium, initial encounter for closed fracture      Evaluation Date: 3/24/2022  Authorization Period Expiration: 6/26/2022  Plan of Care Expiration: 7/1/2022  Progress Note Due: 4/28/2022  Visit # / Visits authorized: 7/14  FOTO: 1/3  Time In: 10:50 am  Time Out: 11:18 pm  Total Billable Time: 23 minutes  Precautions: Standard   Discussed cervical CT results with Dr. Carlos, chief NSGY resident. Patient facet fx healed. Ok to remove c-collar. No follow ups needed at this time. Can return if symptoms change or fail to improve.   Subjective   Pt reports: he was walking upstairs to go into the shower, when he caught his right foot on the stairs and fell on his right rib, and reports "I know for a fact that my rib is broken, and I would like for you to examine it for me. He endorses that he is having difficulty breathing. He endorses that he does not feel like his leg is "picking up" like it used to.   He was compliant with home exercise program.  Response to previous treatment: No adverse effect  Functional change: Improved cervical rotation  Pain: 9/10 (right rib)  Location: (R) rib underneath nipple line  Objective   Observation: Patient demonstrates no evidence of ecchymosis, edema, discoloration or bruising in the region of pain. He reports severe pain with deep " "inspiration and with transferring from sitting to supine.    Palpation: Tenderness to palpation of anterior margin of 4-7th Ribs, no tenderness to palpation at the sternocostal joints.    Cheikh received therapeutic exercises to develop strength, endurance, ROM, flexibility and posture for 00 minutes including:  UBE, standing, Level 2, 3 minutes forward, 3 minutes backward  Open Books, 15x5" hold, bilaterally  Middle Trapezius Horizontal Abduction, 3# pulley, with opposite serratus press, 10x bilateral  Ball Up Wall, 4# Physioball, cervical neutral position  Pec Horizontal Adduction, 3# each UE, cable machine, 2x10    Cheikh received the following manual therapy techniques:   -Assessment and triage measures taken.    Home Exercises Provided and Patient Education Provided     Education provided:   - Home exercise program  Written Home Exercises Provided: yes.  Exercises were reviewed and Cheikh was able to demonstrate them prior to the end of the session.  Cheikh demonstrated fair  understanding of the education provided.     See EMR under Patient Instructions for exercises provided 3/28/2022.    Assessment   Cheikh reports to clinic insisting for his ribs to be assessed for a fracture due to a fall. Cheikh is educated that there is no clinical assessment that can detect a rib fracture without fault, nor the stability of such fracture that might have occurred. Patient is agreeable to this, and wishes to proceed. He does not demonstrate any bruising or ecchymosis, albeit notes severe jolting pain with movement, and often transitions from supine to sitting in an attempt to find his pain, noting Valsalva maneuver and patient is advised against this in precautions of heightened blood pressure. Tenderness to palpation during assessment is not consistently reproduced with PT palpation. PT strongly encourages patient that due to level of irritability, therapy will not be performed today and he is strongly advised to seek care at " an urgent care facility to more accurately assess for rib fracture. Patient is amenenable to this and proceeds out of session.    Cheikh Is progressing well towards his goals.   Pt prognosis is Fair.     Pt will continue to benefit from skilled outpatient physical therapy to address the deficits listed in the problem list box on initial evaluation, provide pt/family education and to maximize pt's level of independence in the home and community environment.     Pt's spiritual, cultural and educational needs considered and pt agreeable to plan of care and goals.     Anticipated barriers to physical therapy: Compliance, Level of Understanding, Fear, Guarding, Catastrophizing Behavior    Goals: Short Term Goals: 4 weeks   1.) Patient will demonstrate independence in compliance and technique of home exercise program provided as per teach-back method of assessment.  2.) Patient will demonstrate fair to good scapular control in repositioning into proper alignment without cueing.  3.) Patient will reach to 120 degrees of shoulder flexion bilaterally to facilitate range of motion for functional ADL performance.  4.) Patient will improve cervical range of motion by +10 degrees in all planes to improve ability to navigate environment.  5.) Patient will report improvement in FOTO score by <60% reported disability.     Long Term Goals: 8 weeks   1.) Patient will demonstrate independence in compliance and technique of home exercise program provided as per teach-back method of assessment.  2.) Patient will demonstrate full thoracic mobility and range of motion in all planes.  3.) Patient will reach to 170 degrees of shoulder flexion bilaterally to facilitate range of motion for functional ADL performance.  4.) Patient will improve cervical range of motion by +20 degrees in all planes to improve ability to navigate environment.  5.) Patient will report improvement in FOTO score by <40% reported disability.  6.) Patient will lift 5#  item overhead for 5x trials with good body mechanics, no fear or guarding and with <3/10 pain.  Plan     Continue to progress as per precautions and as per plan of care.  **PT will be reaching out to referring providers in neurospinal discipline due to patient's lack of understanding of precautions.    Jena Munguia PT, DPT, OCS  License Number: [id number]

## 2022-04-21 ENCOUNTER — HOSPITAL ENCOUNTER (OUTPATIENT)
Dept: RADIOLOGY | Facility: HOSPITAL | Age: 62
Discharge: HOME | End: 2022-04-21
Attending: ORTHOPAEDIC SURGERY
Payer: MEDICAID

## 2022-04-21 DIAGNOSIS — M25.511 ACUTE PAIN OF RIGHT SHOULDER: ICD-10-CM

## 2022-04-21 DIAGNOSIS — Z98.890 S/P RIGHT ROTATOR CUFF REPAIR: ICD-10-CM

## 2022-04-21 DIAGNOSIS — S49.91XA TRAUMATIC INJURY OF RIGHT SHOULDER: ICD-10-CM

## 2022-04-21 PROCEDURE — 73221 MRI SHOULDER WITHOUT CONTRAST RIGHT: ICD-10-PCS | Mod: 26,RT,, | Performed by: RADIOLOGY

## 2022-04-21 PROCEDURE — 73221 MRI JOINT UPR EXTREM W/O DYE: CPT | Mod: 26,RT,, | Performed by: RADIOLOGY

## 2022-04-21 PROCEDURE — 73221 MRI JOINT UPR EXTREM W/O DYE: CPT | Mod: TC,RT

## 2022-04-22 ENCOUNTER — OFFICE VISIT (OUTPATIENT)
Dept: SPORTS MEDICINE | Facility: CLINIC | Age: 62
End: 2022-04-22
Payer: MEDICAID

## 2022-04-22 VITALS
DIASTOLIC BLOOD PRESSURE: 91 MMHG | WEIGHT: 194.44 LBS | BODY MASS INDEX: 28.8 KG/M2 | HEIGHT: 69 IN | SYSTOLIC BLOOD PRESSURE: 144 MMHG | HEART RATE: 93 BPM

## 2022-04-22 DIAGNOSIS — S49.91XA TRAUMATIC INJURY OF RIGHT SHOULDER: ICD-10-CM

## 2022-04-22 DIAGNOSIS — M25.511 ACUTE PAIN OF RIGHT SHOULDER: ICD-10-CM

## 2022-04-22 DIAGNOSIS — Z98.890 S/P RIGHT ROTATOR CUFF REPAIR: Primary | ICD-10-CM

## 2022-04-22 PROCEDURE — 1159F MED LIST DOCD IN RCRD: CPT | Mod: CPTII,,, | Performed by: ORTHOPAEDIC SURGERY

## 2022-04-22 PROCEDURE — 3080F DIAST BP >= 90 MM HG: CPT | Mod: CPTII,,, | Performed by: ORTHOPAEDIC SURGERY

## 2022-04-22 PROCEDURE — 99999 PR PBB SHADOW E&M-EST. PATIENT-LVL III: ICD-10-PCS | Mod: PBBFAC,,, | Performed by: ORTHOPAEDIC SURGERY

## 2022-04-22 PROCEDURE — 1160F RVW MEDS BY RX/DR IN RCRD: CPT | Mod: CPTII,,, | Performed by: ORTHOPAEDIC SURGERY

## 2022-04-22 PROCEDURE — 99213 PR OFFICE/OUTPT VISIT, EST, LEVL III, 20-29 MIN: ICD-10-PCS | Mod: S$PBB,,, | Performed by: ORTHOPAEDIC SURGERY

## 2022-04-22 PROCEDURE — 3080F PR MOST RECENT DIASTOLIC BLOOD PRESSURE >= 90 MM HG: ICD-10-PCS | Mod: CPTII,,, | Performed by: ORTHOPAEDIC SURGERY

## 2022-04-22 PROCEDURE — 99213 OFFICE O/P EST LOW 20 MIN: CPT | Mod: S$PBB,,, | Performed by: ORTHOPAEDIC SURGERY

## 2022-04-22 PROCEDURE — 4010F PR ACE/ARB THEARPY RXD/TAKEN: ICD-10-PCS | Mod: CPTII,,, | Performed by: ORTHOPAEDIC SURGERY

## 2022-04-22 PROCEDURE — 3077F SYST BP >= 140 MM HG: CPT | Mod: CPTII,,, | Performed by: ORTHOPAEDIC SURGERY

## 2022-04-22 PROCEDURE — 4010F ACE/ARB THERAPY RXD/TAKEN: CPT | Mod: CPTII,,, | Performed by: ORTHOPAEDIC SURGERY

## 2022-04-22 PROCEDURE — 1160F PR REVIEW ALL MEDS BY PRESCRIBER/CLIN PHARMACIST DOCUMENTED: ICD-10-PCS | Mod: CPTII,,, | Performed by: ORTHOPAEDIC SURGERY

## 2022-04-22 PROCEDURE — 3008F PR BODY MASS INDEX (BMI) DOCUMENTED: ICD-10-PCS | Mod: CPTII,,, | Performed by: ORTHOPAEDIC SURGERY

## 2022-04-22 PROCEDURE — 99999 PR PBB SHADOW E&M-EST. PATIENT-LVL III: CPT | Mod: PBBFAC,,, | Performed by: ORTHOPAEDIC SURGERY

## 2022-04-22 PROCEDURE — 1159F PR MEDICATION LIST DOCUMENTED IN MEDICAL RECORD: ICD-10-PCS | Mod: CPTII,,, | Performed by: ORTHOPAEDIC SURGERY

## 2022-04-22 PROCEDURE — 3008F BODY MASS INDEX DOCD: CPT | Mod: CPTII,,, | Performed by: ORTHOPAEDIC SURGERY

## 2022-04-22 PROCEDURE — 99213 OFFICE O/P EST LOW 20 MIN: CPT | Mod: PBBFAC,PN | Performed by: ORTHOPAEDIC SURGERY

## 2022-04-22 PROCEDURE — 3077F PR MOST RECENT SYSTOLIC BLOOD PRESSURE >= 140 MM HG: ICD-10-PCS | Mod: CPTII,,, | Performed by: ORTHOPAEDIC SURGERY

## 2022-04-22 NOTE — PROGRESS NOTES
Subjective:          Chief Complaint: Cheikh Resendez is a 61 y.o. male who had concerns including Results of the Right Shoulder (MRI ).    HPI     Patient presents for follow-up for for right shoulder pain.  Patient obtained MRI of the right shoulder is since his last visit, see detailed findings below.  Patient states his right shoulder pain is unchanged since last visit and still vague in nature but feels that it is with in the joint.  He has been attending physical therapy at the [facility] location for his neck, and states they have been incorporating some shoulder work.    Interval history 04/08/2022:  Patient who is RHD, surgical hx of bilateral RC repair and works as a  presents to clinic with right shoulder pain x 5 months. Patient states last November he was working underneath a truck when the roberto carlos propping the the truck up disengaged causing the truck to fall on his chest and right shoulder.  Patient visited the emergency department immediately afterwards and received multiple x-rays which revealed fractures at C6, C7, as well as the manubrium.  He was cleared by orthopedics, placed in C-collar, and appointments with neuro surgery and an order for cervical MRI were placed.  He has dealt with the pain in the lateral aspect of his shoulder since this event.  He has been going to physical therapy for his neck which is also included some exercises involving the shoulder that have not helped.  He rates the pain as 7/10, is made worse with any shoulder movement  He has attempted multiple conservative measures that include activity modification, ice & elevation, and oral medications (oxycodone and gabapentin), that have not relieved the pain. He denies any mechanical symptoms to include locking and catching or instability.  Is affecting ADLs and limiting desired level of activity, as he has been unable to work as a  since the injury. Denies numbness, tingling, radiation, and inability to  bear weight. He is here today to discuss treatment options.    SSV%: 50%    Surgical history:     ROTATOR CUFF REPAIR Left 1998    ROTATOR CUFF REPAIR Right 1998    ROTATOR CUFF REPAIR W/ DISTAL CLAVICLE EXCISION Left 1998    SHOULDER ARTHROSCOPY Bilateral 2009           Review of Systems   Constitutional: Negative.   HENT: Negative.    Eyes: Negative.    Cardiovascular: Negative.    Respiratory: Negative.    Endocrine: Negative.    Hematologic/Lymphatic: Negative.    Skin: Negative.    Musculoskeletal: Positive for back pain, joint pain, joint swelling, neck pain and stiffness. Negative for arthritis and gout.   Neurological: Negative.    Psychiatric/Behavioral: Negative.    Allergic/Immunologic: Negative.                    Objective:        General: Cheikh is well-developed, well-nourished, appears stated age, in no acute distress, alert and oriented to time, place and person.     General    Nursing note and vitals reviewed.  Constitutional: He is oriented to person, place, and time. He appears well-developed and well-nourished. No distress.   HENT:   Head: Normocephalic and atraumatic.   Nose: Nose normal.   Eyes: EOM are normal.   Cardiovascular: Intact distal pulses.    Pulmonary/Chest: Effort normal. No respiratory distress.   Neurological: He is alert and oriented to person, place, and time.   Psychiatric: He has a normal mood and affect. His behavior is normal. Judgment and thought content normal.         Right Shoulder Exam     Inspection/Observation   Swelling: absent  Bruising: absent  Scars: absent  Deformity: absent  Scapular Winging: absent  Scapular Dyskinesia: negative  Atrophy: absent    Tenderness   The patient is tender to palpation of the acromioclavicular joint, biceps tendon and supraspinatus.    Range of Motion   Active abduction: 120   Passive abduction: 140   Forward Flexion: 140   External Rotation 0 degrees: 60   Internal rotation 0 degrees: Lumbar     Tests & Signs   Drop arm:  negative  [place] test: negative  Impingement: positive  Rotator Cuff Painful Arc/Range: moderate  Lift Off Sign: negative  Belly Press: negative  Active Compression Test ([place]'s Sign): positive  Speed's Test: positive  Bear Hug: negative    Other   Sensation: normal    Left Shoulder Exam   Left shoulder exam is normal.    Inspection/Observation   Swelling: absent  Bruising: absent  Scars: absent  Deformity: absent  Scapular Winging: absent  Scapular Dyskinesia: negative  Atrophy: absent    Tenderness   The patient is experiencing no tenderness.     Range of Motion   Active abduction: 170   Passive abduction: 170   Forward Flexion: 180   External Rotation 0 degrees: 60   Internal rotation 0 degrees: Lumbar     Other   Sensation: normal       Muscle Strength   Right Upper Extremity   Shoulder Abduction: 5/5   Shoulder Internal Rotation: 5/5   Shoulder External Rotation: 4/5   Supraspinatus: 4/5   Subscapularis: 4/5   Biceps: 4/5   Left Upper Extremity  Shoulder Abduction: 5/5   Shoulder Internal Rotation: 5/5   Shoulder External Rotation: 5/5   Supraspinatus: 5/5   Subscapularis: 5/5   Biceps: 5/5     Vascular Exam     Right Pulses      Radial:                    2+      Left Pulses      Radial:                    2+      Capillary Refill  Right Hand: normal capillary refill  Left Hand: normal capillary refill        Previous Radiographs right shoulder    My interpretation:    Prominent widening of the AC joint; possible postsurgical changes from AC joint resection    Mild glenohumeral arthritis    MRI right shoulder    My interpretation:    No tear visualized of the surgical repaired rotator cuff tendons, though tendinosis noted within the subscapularis, infraspinatus, and biceps tendons as well as circumferential fraying within the labrum    Radiologist findings:    1. Postsurgical changes of rotator cuff repair.  No full-thickness re-tear.  Tendinosis and partial-thickness tear of the subscapularis tendon.   Tendinosis of infraspinatus tendon.  Mild loss of supraspinatus muscle bulk.  2. Biceps tendinosis and tenosynovitis with medial subluxation of the biceps tendon into the substance of the subscapularis tendon.  3. Circumferential fraying of the labrum.        Assessment:       Encounter Diagnoses   Name Primary?    S/P right rotator cuff repair Yes    Traumatic injury of right shoulder     Acute pain of right shoulder           Plan:       1. I made the decision to order new imaging of the extremity or extremities evaluated. I independently reviewed and interpreted the radiographs and/or MRIs today. These images were shown to the patient where I then discussed my findings in detail.    2. We again discussed at length different treatment options including conservative vs surgical management. These include anti-inflammatories, acetaminophen, rest, ice, heat, formal physical therapy including strengthening and stretching exercises, home exercise programs, dry needling, and finally surgical intervention. Based on MRI findings I believe we should begin with conservative management to include physical therapy.  This will begin after he has completed physical therapy for his neck.    3. RTC to see Giovany reyes Will contact patient in 8 weeks once he has completed PT for his neck and determine if patient could benefit from physical therapy focusing on the shoulder.  Will schedule follow-up at this time as well.      All of the patient's questions were answered. Patient was advised to call the clinic or contact me through the patient portal for any questions or concerns.                     Patient questionnaires may have been collected.

## 2022-05-02 ENCOUNTER — CLINICAL SUPPORT (OUTPATIENT)
Dept: REHABILITATION | Facility: HOSPITAL | Age: 62
End: 2022-05-02
Payer: MEDICAID

## 2022-05-02 DIAGNOSIS — R29.3 POSTURE ABNORMALITY: Primary | ICD-10-CM

## 2022-05-02 PROCEDURE — 97110 THERAPEUTIC EXERCISES: CPT | Mod: PO

## 2022-05-02 NOTE — PROGRESS NOTES
Physical Therapy Daily Treatment Note     [facility]  Cl[facility] Number: [id number]    Therapy Diagnosis:   Encounter Diagnosis   Name Primary?    Posture abnormality Yes     Physician: Sadie Nickerson MD    Visit Date: 5/2/2022  Physician Orders: PT Eval and Treat   Medical Diagnosis from Referral:   T14.90XA (ICD-10-CM) - Injury, unspecified, initial encounter   S22.21XA (ICD-10-CM) - Fracture of manubrium, initial encounter for closed fracture      Evaluation Date: 3/24/2022  Authorization Period Expiration: 6/26/2022  Plan of Care Expiration: 7/1/2022  Progress Note Due: 4/28/2022  Visit # / Visits authorized: 8/14  FOTO: 1/3    Time In: 1047am  Time Out: 1127am  Total Billable Time: 40 minutes    Precautions: Standard   Discussed cervical CT results with Dr. Carlos, chief NSGY resident. Patient facet fx healed. Ok to remove c-collar. No follow ups needed at this time. Can return if symptoms change or fail to improve.   Subjective   Pt reports: that overall he is feeling pretty good right now. He was able to work a good bit over the weekend. He still is feeling the pain on the R side of his rib cage but does feel like it is slowly getting better.      He was compliant with home exercise program.  Response to previous treatment: No adverse effect  Functional change: Improved cervical rotation  Pain: 9/10 (right rib)  Location: (R) rib underneath nipple line  Objective   Observation: Patient demonstrates no evidence of ecchymosis, edema, discoloration or bruising in the region of pain. He reports severe pain with deep inspiration and with transferring from sitting to supine.    Palpation: Tenderness to palpation of anterior margin of 4-7th Ribs, no tenderness to palpation at the sternocostal joints.    Cheikh received therapeutic exercises to develop strength, endurance, ROM, flexibility and posture for 40 minutes including:    UBE, standing, Level 2, 3 minutes forward, 3 minutes backward  Seated thoracic  "extension over a chair, 2 x 10  Open Books, 15x5" hold, bilaterally  Prone row with ER, 3#, 2 x 8 bilaterally  Middle Trapezius Horizontal Abduction, 3# pulley, with opposite serratus press, 10x bilateral  Serratus wall slides with GTB around wrists and foam roll, 3 x 8  Pec Horizontal Adduction, 3# each UE, cable machine, 2x10   carries with 2# DB, 3 laps with the R arm  Prone row with ER 2#, 2 x 10 on the R    Cheikh received the following manual therapy techniques:   -Assessment and triage measures taken.    Home Exercises Provided and Patient Education Provided     Education provided:   - Home exercise program  Written Home Exercises Provided: yes.  Exercises were reviewed and Cheikh was able to demonstrate them prior to the end of the session.  Cheikh demonstrated fair  understanding of the education provided.     See EMR under Patient Instructions for exercises provided 3/28/2022.    Assessment     Cheikh remains with R sided rib pain that has improved since its onset a few weeks ago. He had an X-ray that ruled out a fracture and was told that he just bruised the rib. He remains with excessive stiffness throughout his thoracic spine so increased time was spent on improving thoracic mobility today. He struggles with exercises requiring L UE usage due to a long history of L shoulder pain following surgery. Most of his exercises were held on the L UE today.     Cheikh Is progressing well towards his goals.   Pt prognosis is Fair.     Pt will continue to benefit from skilled outpatient physical therapy to address the deficits listed in the problem list box on initial evaluation, provide pt/family education and to maximize pt's level of independence in the home and community environment.     Pt's spiritual, cultural and educational needs considered and pt agreeable to plan of care and goals.     Anticipated barriers to physical therapy: Compliance, Level of Understanding, Fear, Guarding, Catastrophizing " Behavior    Goals: Short Term Goals: 4 weeks   1.) Patient will demonstrate independence in compliance and technique of home exercise program provided as per teach-back method of assessment.  2.) Patient will demonstrate fair to good scapular control in repositioning into proper alignment without cueing.  3.) Patient will reach to 120 degrees of shoulder flexion bilaterally to facilitate range of motion for functional ADL performance.  4.) Patient will improve cervical range of motion by +10 degrees in all planes to improve ability to navigate environment.  5.) Patient will report improvement in FOTO score by <60% reported disability.     Long Term Goals: 8 weeks   1.) Patient will demonstrate independence in compliance and technique of home exercise program provided as per teach-back method of assessment.  2.) Patient will demonstrate full thoracic mobility and range of motion in all planes.  3.) Patient will reach to 170 degrees of shoulder flexion bilaterally to facilitate range of motion for functional ADL performance.  4.) Patient will improve cervical range of motion by +20 degrees in all planes to improve ability to navigate environment.  5.) Patient will report improvement in FOTO score by <40% reported disability.  6.) Patient will lift 5# item overhead for 5x trials with good body mechanics, no fear or guarding and with <3/10 pain.  Plan     Continue to progress as per precautions and as per plan of care.  **PT will be reaching out to referring providers in neurospinal discipline due to patient's lack of understanding of precautions.    Terry Saez PT, DPT

## 2022-05-11 NOTE — PROGRESS NOTES
Physical Therapy Daily Treatment Note     [facility]  Cl[facility] Number: [id number]    Therapy Diagnosis:   Encounter Diagnosis   Name Primary?    Posture abnormality Yes     Physician: Sadie Nickerson MD    Visit Date: 5/12/2022  Physician Orders: PT Eval and Treat   Medical Diagnosis from Referral:   T14.90XA (ICD-10-CM) - Injury, unspecified, initial encounter   S22.21XA (ICD-10-CM) - Fracture of manubrium, initial encounter for closed fracture      Evaluation Date: 3/24/2022  Authorization Period Expiration: 6/26/2022  Plan of Care Expiration: 7/1/2022  Progress Note Due: 6/12/2022  Visit # / Visits authorized: 9/14  FOTO: 1/3    Time In: 1145am  Time Out: 1225pm  Total Billable Time: 40 minutes    Precautions: Standard   Discussed cervical CT results with Dr. Carlos, chief NSGY resident. Patient facet fx healed. Ok to remove c-collar. No follow ups needed at this time. Can return if symptoms change or fail to improve.   Subjective   Pt reports: that he is still struggling the the pain in his R rib and it is taking longer to heal than normal. His neck is also bothering him from time to time. He had a piece of wood fall on his foot and he broke his big toe recently.      He was compliant with home exercise program.  Response to previous treatment: No adverse effect  Functional change: Improved cervical rotation  Pain: 9/10 (right rib)  Location: (R) rib underneath nipple line  Objective     Sensation: Cheikh report normal and symmetrical sensation in bilateral dermatomal levels.       Right Left   Upper Trapezius (C2,3,4)       Shoulder Abduction (C5) 3-/5 (80 degrees) 3-/5 (85 degrees)   Elbow Flexion (C6) 3/5 (unable to hold against any external resistance) 4/5   Elbow Extension (C7) 4/5 (increased pain) 4/5   Wrist Flexion(C7) 4/5 4/5   Wrist Extension (C6) 3-/5 (limited range) 3-/5   Thumb Abduction (C8-T1) 4/5 4/5      Posture/Observation: Cheikh remains with muscles guarding throughout the neck that  "does seem to be limiting his ROM. His excessive thoracic kyphosis and lack of thoracic extension is contributing to his lack of shoulder ROM.     Range of Motion/Strength:   CERVICAL AROM Pain/Dysfunction with Movement   Flexion (45-50 degrees) 35 degrees Yes   Extension 15 degrees Yes   Right side bending Not tested Yes   Left side bending Not tested Yes   Right rotation (90 degrees) 15 degrees Yes   Left rotation (90 degrees) 15 degrees Yes         Shoulder Right Left Pain/Dysfunction with Movement   AROM         Flexion (scapular plane)  100 degrees  90 degrees Prefers to raise and lower passively; considerable encouragement to raise and lower independently   Extension  Not tested  Not tested     Abduction  45 degrees  45 degrees Prefers to raise and lower passively; considerable encouragement to raise and lower independently   Adduction  Not tested  Not tested     Internal rotation (Combined Movements)  Not tested  Not tested     ER (Combined Motions) Not tested Not tested     ER at 0° abd  25 degrees  10 degrees Significant pain and guarding          Cheikh received therapeutic exercises to develop strength, endurance, ROM, flexibility and posture for 40 minutes including:    Assessment as above  UBE, standing, Level 2, 3 minutes forward, 3 minutes backward  Seated thoracic extension over a chair, 2 x 10  Open Books, 15x5" hold, bilaterally  Prone row with ER, 5#, 2 x 8 bilaterally  Middle Trapezius Horizontal Abduction, 3# pulley, with opposite serratus press, 10x bilateral  Serratus wall slides with GTB around wrists and foam roll, 3 x 8  Pec Horizontal Adduction, 3# each UE, cable machine, 2x10   carries with 2# DB, 3 laps with the R arm  L shoulder ER with YTB, 2 x 10  Side lying ER on L shoulder, 3 x 8    Cheikh received the following manual therapy techniques:   -Assessment and triage measures taken.    Home Exercises Provided and Patient Education Provided     Education provided:   - Home exercise " program  Written Home Exercises Provided: yes.  Exercises were reviewed and Cheikh was able to demonstrate them prior to the end of the session.  Cheikh demonstrated fair  understanding of the education provided.     See EMR under Patient Instructions for exercises provided 3/28/2022.    Assessment     Cheikh has been seen for 10 visits over the past 7 weeks to address shoulder and neck pain following a high impact traumatic event. His exercises have focused on improving his cervical ROM, deep neck flexor stabilization, thoracic mobility and rotator cuff/periscapular muscles strengthening. He has demonstrated improvements in his cervical ROM and is reporting improvements in his neck pain. He remains with thoracic spine stiffness. He lack a lot of strength and mobility in his L shoulder due to a past surgery so this was a focus of today's treatment session. He will continue to benefit from skilled PT services at this time.     Cheikh Is progressing well towards his goals.   Pt prognosis is Fair.     Pt will continue to benefit from skilled outpatient physical therapy to address the deficits listed in the problem list box on initial evaluation, provide pt/family education and to maximize pt's level of independence in the home and community environment.     Pt's spiritual, cultural and educational needs considered and pt agreeable to plan of care and goals.     Anticipated barriers to physical therapy: Compliance, Level of Understanding, Fear, Guarding, Catastrophizing Behavior    Goals: Short Term Goals: 4 weeks   1.) Patient will demonstrate independence in compliance and technique of home exercise program provided as per teach-back method of assessment.  2.) Patient will demonstrate fair to good scapular control in repositioning into proper alignment without cueing.  3.) Patient will reach to 120 degrees of shoulder flexion bilaterally to facilitate range of motion for functional ADL performance.  4.) Patient will  improve cervical range of motion by +10 degrees in all planes to improve ability to navigate environment.  5.) Patient will report improvement in FOTO score by <60% reported disability.     Long Term Goals: 8 weeks   1.) Patient will demonstrate independence in compliance and technique of home exercise program provided as per teach-back method of assessment.  2.) Patient will demonstrate full thoracic mobility and range of motion in all planes.  3.) Patient will reach to 170 degrees of shoulder flexion bilaterally to facilitate range of motion for functional ADL performance.  4.) Patient will improve cervical range of motion by +20 degrees in all planes to improve ability to navigate environment.  5.) Patient will report improvement in FOTO score by <40% reported disability.  6.) Patient will lift 5# item overhead for 5x trials with good body mechanics, no fear or guarding and with <3/10 pain.  Plan     Continue to progress as per precautions and as per plan of care.  **PT will be reaching out to referring providers in neurospinal discipline due to patient's lack of understanding of precautions.    Terry Saez PT, DPT

## 2022-05-12 ENCOUNTER — CLINICAL SUPPORT (OUTPATIENT)
Dept: REHABILITATION | Facility: HOSPITAL | Age: 62
End: 2022-05-12
Payer: MEDICAID

## 2022-05-12 DIAGNOSIS — R29.3 POSTURE ABNORMALITY: Primary | ICD-10-CM

## 2022-05-12 PROCEDURE — 97110 THERAPEUTIC EXERCISES: CPT | Mod: PO

## 2022-05-16 ENCOUNTER — CLINICAL SUPPORT (OUTPATIENT)
Dept: REHABILITATION | Facility: HOSPITAL | Age: 62
End: 2022-05-16
Payer: MEDICAID

## 2022-05-16 DIAGNOSIS — R29.3 POSTURE ABNORMALITY: Primary | ICD-10-CM

## 2022-05-16 PROCEDURE — 97110 THERAPEUTIC EXERCISES: CPT | Mod: PO

## 2022-05-16 NOTE — PROGRESS NOTES
"  Physical Therapy Daily Treatment Note     [facility]  Cl[facility] Number: [id number]    Therapy Diagnosis:   Encounter Diagnosis   Name Primary?    Posture abnormality Yes     Physician: Sadie Nickerson MD    Visit Date: 5/16/2022  Physician Orders: PT Eval and Treat   Medical Diagnosis from Referral:   T14.90XA (ICD-10-CM) - Injury, unspecified, initial encounter   S22.21XA (ICD-10-CM) - Fracture of manubrium, initial encounter for closed fracture      Evaluation Date: 3/24/2022  Authorization Period Expiration: 6/26/2022  Plan of Care Expiration: 7/1/2022  Progress Note Due: 6/12/2022  Visit # / Visits authorized: 10/14  FOTO: 1/3    Time In: 1045am  Time Out: 1125am  Total Billable Time: 40 minutes    Precautions: Standard   Discussed cervical CT results with Dr. Carlos, chief NSGY resident. Patient facet fx healed. Ok to remove c-collar. No follow ups needed at this time. Can return if symptoms change or fail to improve.   Subjective   Pt reports: that his rib does continue to bother him but he feels like it is getting better slowly. He dropped something else on his foot earlier today so he is still having pain there.      He was compliant with home exercise program.  Response to previous treatment: No adverse effect  Functional change: Improved cervical rotation  Pain: 77/10 (right rib)  Location: (R) rib underneath nipple line  Objective     Cheikh received therapeutic exercises to develop strength, endurance, ROM, flexibility and posture for 40 minutes including:    UBE, standing, Level 2, 3 minutes forward, 3 minutes backward  Seated thoracic extension over a chair, 2 x 10  Open Books, 15x5" hold, bilaterally  Prone row with ER, 5#, 2 x 8 bilaterally  Middle Trapezius Horizontal Abduction, 3# pulley, with opposite serratus press, 10x bilateral  Serratus wall slides with GTB around wrists and foam roll, 3 x 8  Pec Horizontal Adduction, 3# each UE, cable machine, 2x10   carries with 2# DB, 3 laps " with the R arm  L shoulder ER and IR with YTB, 2 x 10  Side lying ER on L shoulder 1#, 3 x 8    Cheikh received the following manual therapy techniques:       Home Exercises Provided and Patient Education Provided     Education provided:   - Home exercise program  Written Home Exercises Provided: yes.  Exercises were reviewed and Cheikh was able to demonstrate them prior to the end of the session.  Cheikh demonstrated fair  understanding of the education provided.     See EMR under Patient Instructions for exercises provided 3/28/2022.    Assessment     Cheikh remains with his R sided rib that he feels is slowly improving. He continues to report his L shoulder weakness following a surgery many years ago that he feels he did not recover very well from. He continues to be challenged with shoulder and periscapular muscle strengthening exercises.    Cheikh Is progressing well towards his goals.   Pt prognosis is Fair.     Pt will continue to benefit from skilled outpatient physical therapy to address the deficits listed in the problem list box on initial evaluation, provide pt/family education and to maximize pt's level of independence in the home and community environment.     Pt's spiritual, cultural and educational needs considered and pt agreeable to plan of care and goals.     Anticipated barriers to physical therapy: Compliance, Level of Understanding, Fear, Guarding, Catastrophizing Behavior    Goals: Short Term Goals: 4 weeks   1.) Patient will demonstrate independence in compliance and technique of home exercise program provided as per teach-back method of assessment.  2.) Patient will demonstrate fair to good scapular control in repositioning into proper alignment without cueing.  3.) Patient will reach to 120 degrees of shoulder flexion bilaterally to facilitate range of motion for functional ADL performance.  4.) Patient will improve cervical range of motion by +10 degrees in all planes to improve ability to  navigate environment.  5.) Patient will report improvement in FOTO score by <60% reported disability.     Long Term Goals: 8 weeks   1.) Patient will demonstrate independence in compliance and technique of home exercise program provided as per teach-back method of assessment.  2.) Patient will demonstrate full thoracic mobility and range of motion in all planes.  3.) Patient will reach to 170 degrees of shoulder flexion bilaterally to facilitate range of motion for functional ADL performance.  4.) Patient will improve cervical range of motion by +20 degrees in all planes to improve ability to navigate environment.  5.) Patient will report improvement in FOTO score by <40% reported disability.  6.) Patient will lift 5# item overhead for 5x trials with good body mechanics, no fear or guarding and with <3/10 pain.  Plan     Continue to progress as per precautions and as per plan of care.  **PT will be reaching out to referring providers in neurospinal discipline due to patient's lack of understanding of precautions.    Terry Saez PT, DPT

## 2022-05-19 ENCOUNTER — CLINICAL SUPPORT (OUTPATIENT)
Dept: REHABILITATION | Facility: HOSPITAL | Age: 62
End: 2022-05-19
Payer: MEDICAID

## 2022-05-19 DIAGNOSIS — M79.18 MYOFASCIAL MUSCLE PAIN: ICD-10-CM

## 2022-05-19 DIAGNOSIS — F40.298 FEAR OF PAIN: ICD-10-CM

## 2022-05-19 DIAGNOSIS — R29.898 DECREASED RANGE OF MOTION OF NECK: ICD-10-CM

## 2022-05-19 DIAGNOSIS — M53.84 DECREASED RANGE OF MOTION OF THORACIC SPINE: ICD-10-CM

## 2022-05-19 DIAGNOSIS — R29.3 POSTURE ABNORMALITY: Primary | ICD-10-CM

## 2022-05-19 PROCEDURE — 97110 THERAPEUTIC EXERCISES: CPT | Mod: PO

## 2022-05-19 NOTE — PROGRESS NOTES
"  Physical Therapy Daily Treatment Note and Progress Note     Name: Cheikh Resendez  Clinic Number: [id number]    Therapy Diagnosis:   Encounter Diagnoses   Name Primary?    Posture abnormality Yes    Fear of pain     Decreased range of motion of neck     Decreased range of motion of thoracic spine     Myofascial muscle pain      Physician: Sadie Nickerson MD    Visit Date: 5/19/2022  Physician Orders: PT Eval and Treat   Medical Diagnosis from Referral:   T14.90XA (ICD-10-CM) - Injury, unspecified, initial encounter   S22.21XA (ICD-10-CM) - Fracture of manubrium, initial encounter for closed fracture      Evaluation Date: 3/24/2022  Authorization Period Expiration: 6/26/2022  Plan of Care Expiration: 7/1/2022  Progress Note Due: 6/12/2022  Visit # / Visits authorized: 11/14  FOTO: 1/3  Time In: 11:50 am  Time Out: 12:30 pm  Total Billable Time: 40 minutes  Precautions: Standard   Discussed cervical CT results with Dr. Carlos, chief NSGY resident. Patient facet fx healed. Ok to remove c-collar. No follow ups needed at this time. Can return if symptoms change or fail to improve.   Subjective   Pt reports: he has "been working a little" but states that he does not know when or if he is following up with the MD who cared for his cervical fractures. He notes pain in his left shoulder when rotating outward, and that his rib pain has improved, but his neck is a little sore today.      He was compliant with home exercise program.  Response to previous treatment: No adverse effect  Functional change: Improved cervical rotation  Pain: (cervical left sided sub-occipital) 6/10 pain;  Location: (R) rib underneath nipple line  Objective   **All charges billed as therapeutic exercise as per Medicaid rule.    Cervical AROM Pain/Dysfunction with movement   Flexion  (45-50 degrees) 35 degrees    Extension  (80 degrees) 40 degrees    Right Side Bending  (40 degrees) 20 degrees    Left Side Bending  (40 degrees) -    Right " "Rotation  (90 degrees) 55 degrees 65 degrees with passive shoulder elevation and correction of cervical extension with rotation   Left Rotation  (90 degrees) 55 degrees      Joint Mobility:  C1-C2 Sidebending/Rotation: + bilaterally  C1-C3 Cervical Flexion/Rotation Test: (+) bilaterally    Cheikh received therapeutic exercises to develop strength, endurance, ROM, flexibility and posture for 25 minutes including:  UBE, standing, Level 2, 3 minutes forward, 3 minutes backward  Chin Tucks, 10x10" hold  Supine Thoracic Extension over a foam roller, upper and middle thoracic spine 2 x 10 each level  Sitting Lat Dorsi Stretch with Dowel, 3x30"  Sidelying ER L Shoulder 1#, 3 x 10    Cheikh received the following manual therapy techniques for 15 minutes:   MET Cervical Flexion/Rotation, 5x7" hold each side  Assessment measures taken (see above)  Teres Major/Lat Dorsi Release + MET, 5x7" hold    Home Exercises Provided and Patient Education Provided     Education provided:   - Home exercise program  Written Home Exercises Provided: yes.  Exercises were reviewed and Cheikh was able to demonstrate them prior to the end of the session.  Cheikh demonstrated fair  understanding of the education provided.     See EMR under Patient Instructions for exercises provided 3/28/2022.    Assessment   Cheikh has undergone 11 visits in the care of his continues to demonstrate impaired thoracic and cervical range of motion deficits and impaired joint mobility restrictions that improve minimally after manual therapy intervention. He demonstrates a great deal of muscle restriction in his lat dorsi, teres major, and in pectoralis major this visit, as well as decreased shoulder strength.    Cheikh Is progressing well towards his goals.   Pt prognosis is Fair.     Pt will continue to benefit from skilled outpatient physical therapy to address the deficits listed in the problem list box on initial evaluation, provide pt/family education and to maximize " pt's level of independence in the home and community environment.     Pt's spiritual, cultural and educational needs considered and pt agreeable to plan of care and goals.     Anticipated barriers to physical therapy: Compliance, Level of Understanding, Fear, Guarding, Catastrophizing Behavior    Goals: Short Term Goals: 4 weeks   1.) Patient will demonstrate independence in compliance and technique of home exercise program provided as per teach-back method of assessment.  2.) Patient will demonstrate fair to good scapular control in repositioning into proper alignment without cueing.  3.) Patient will reach to 120 degrees of shoulder flexion bilaterally to facilitate range of motion for functional ADL performance.  4.) Patient will improve cervical range of motion by +10 degrees in all planes to improve ability to navigate environment.  5.) Patient will report improvement in FOTO score by <60% reported disability.     Long Term Goals: 8 weeks   1.) Patient will demonstrate independence in compliance and technique of home exercise program provided as per teach-back method of assessment.  2.) Patient will demonstrate full thoracic mobility and range of motion in all planes.  3.) Patient will reach to 170 degrees of shoulder flexion bilaterally to facilitate range of motion for functional ADL performance.  4.) Patient will improve cervical range of motion by +20 degrees in all planes to improve ability to navigate environment.  5.) Patient will report improvement in FOTO score by <40% reported disability.  6.) Patient will lift 5# item overhead for 5x trials with good body mechanics, no fear or guarding and with <3/10 pain.  Plan     Continue to progress as per precautions and as per plan of care. Patient has two visits remaining. PT to reach out to patient's referring provider to provide an update on patient's progress.  Jena Munguia PT, DPT, OCS  License Number: [id number]

## 2022-05-19 NOTE — PLAN OF CARE
"  Physical Therapy Daily Treatment Note and Progress Note     Name: Cheikh Resendez  Clinic Number: [id number]    Therapy Diagnosis:   Encounter Diagnoses   Name Primary?    Posture abnormality Yes    Fear of pain     Decreased range of motion of neck     Decreased range of motion of thoracic spine     Myofascial muscle pain      Physician: Sadie Nickerson MD    Visit Date: 5/19/2022  Physician Orders: PT Eval and Treat   Medical Diagnosis from Referral:   T14.90XA (ICD-10-CM) - Injury, unspecified, initial encounter   S22.21XA (ICD-10-CM) - Fracture of manubrium, initial encounter for closed fracture      Evaluation Date: 3/24/2022  Authorization Period Expiration: 6/26/2022  Plan of Care Expiration: 7/1/2022  Progress Note Due: 6/12/2022  Visit # / Visits authorized: 11/14  FOTO: 1/3  Time In: 11:50 am  Time Out: 12:30 pm  Total Billable Time: 40 minutes  Precautions: Standard   Discussed cervical CT results with Dr. Carlos, chief NSGY resident. Patient facet fx healed. Ok to remove c-collar. No follow ups needed at this time. Can return if symptoms change or fail to improve.   Subjective   Pt reports: he has "been working a little" but states that he does not know when or if he is following up with the MD who cared for his cervical fractures. He notes pain in his left shoulder when rotating outward, and that his rib pain has improved, but his neck is a little sore today.      He was compliant with home exercise program.  Response to previous treatment: No adverse effect  Functional change: Improved cervical rotation  Pain: (cervical left sided sub-occipital) 6/10 pain;  Location: (R) rib underneath nipple line  Objective   **All charges billed as therapeutic exercise as per Medicaid rule.    Cervical AROM Pain/Dysfunction with movement   Flexion  (45-50 degrees) 35 degrees    Extension  (80 degrees) 40 degrees    Right Side Bending  (40 degrees) 20 degrees    Left Side Bending  (40 degrees) -    Right " "Rotation  (90 degrees) 55 degrees 65 degrees with passive shoulder elevation and correction of cervical extension with rotation   Left Rotation  (90 degrees) 55 degrees      Joint Mobility:  C1-C2 Sidebending/Rotation: + bilaterally  C1-C3 Cervical Flexion/Rotation Test: (+) bilaterally    Cheikh received therapeutic exercises to develop strength, endurance, ROM, flexibility and posture for 25 minutes including:  UBE, standing, Level 2, 3 minutes forward, 3 minutes backward  Chin Tucks, 10x10" hold  Supine Thoracic Extension over a foam roller, upper and middle thoracic spine 2 x 10 each level  Sitting Lat Dorsi Stretch with Dowel, 3x30"  Sidelying ER L Shoulder 1#, 3 x 10    Cheikh received the following manual therapy techniques for 15 minutes:   MET Cervical Flexion/Rotation, 5x7" hold each side  Assessment measures taken (see above)  Teres Major/Lat Dorsi Release + MET, 5x7" hold    Home Exercises Provided and Patient Education Provided     Education provided:   - Home exercise program  Written Home Exercises Provided: yes.  Exercises were reviewed and Cheikh was able to demonstrate them prior to the end of the session.  Cheikh demonstrated fair  understanding of the education provided.     See EMR under Patient Instructions for exercises provided 3/28/2022.    Assessment   Cheikh has undergone 11 visits in the care of his continues to demonstrate impaired thoracic and cervical range of motion deficits and impaired joint mobility restrictions that improve minimally after manual therapy intervention. He demonstrates a great deal of muscle restriction in his lat dorsi, teres major, and in pectoralis major this visit, as well as decreased shoulder strength.    Cheikh Is progressing well towards his goals.   Pt prognosis is Fair.     Pt will continue to benefit from skilled outpatient physical therapy to address the deficits listed in the problem list box on initial evaluation, provide pt/family education and to maximize " pt's level of independence in the home and community environment.     Pt's spiritual, cultural and educational needs considered and pt agreeable to plan of care and goals.     Anticipated barriers to physical therapy: Compliance, Level of Understanding, Fear, Guarding, Catastrophizing Behavior    Goals: Short Term Goals: 4 weeks   1.) Patient will demonstrate independence in compliance and technique of home exercise program provided as per teach-back method of assessment.  2.) Patient will demonstrate fair to good scapular control in repositioning into proper alignment without cueing.  3.) Patient will reach to 120 degrees of shoulder flexion bilaterally to facilitate range of motion for functional ADL performance.  4.) Patient will improve cervical range of motion by +10 degrees in all planes to improve ability to navigate environment.  5.) Patient will report improvement in FOTO score by <60% reported disability.     Long Term Goals: 8 weeks   1.) Patient will demonstrate independence in compliance and technique of home exercise program provided as per teach-back method of assessment.  2.) Patient will demonstrate full thoracic mobility and range of motion in all planes.  3.) Patient will reach to 170 degrees of shoulder flexion bilaterally to facilitate range of motion for functional ADL performance.  4.) Patient will improve cervical range of motion by +20 degrees in all planes to improve ability to navigate environment.  5.) Patient will report improvement in FOTO score by <40% reported disability.  6.) Patient will lift 5# item overhead for 5x trials with good body mechanics, no fear or guarding and with <3/10 pain.  Plan     Continue to progress as per precautions and as per plan of care. Patient has two visits remaining. PT to reach out to patient's referring provider to provide an update on patient's progress.  Jena Munguia PT, DPT, OCS  License Number: [id number]

## 2022-06-06 ENCOUNTER — CLINICAL SUPPORT (OUTPATIENT)
Dept: REHABILITATION | Facility: HOSPITAL | Age: 62
End: 2022-06-06
Payer: MEDICAID

## 2022-06-06 DIAGNOSIS — R29.3 POSTURE ABNORMALITY: Primary | ICD-10-CM

## 2022-06-06 DIAGNOSIS — M53.84 DECREASED RANGE OF MOTION OF THORACIC SPINE: ICD-10-CM

## 2022-06-06 DIAGNOSIS — R29.898 DECREASED RANGE OF MOTION OF NECK: ICD-10-CM

## 2022-06-06 DIAGNOSIS — F40.298 FEAR OF PAIN: ICD-10-CM

## 2022-06-06 PROCEDURE — 97110 THERAPEUTIC EXERCISES: CPT | Mod: PO

## 2022-06-06 NOTE — PROGRESS NOTES
Physical Therapy Daily Treatment Note and Progress Note     Name: Cheikh Resendez  Clinic Number: [id number]    Therapy Diagnosis:   Encounter Diagnoses   Name Primary?    Posture abnormality Yes    Fear of pain     Decreased range of motion of neck     Decreased range of motion of thoracic spine      Physician: Sadie Nickerson MD    Visit Date: 6/6/2022  Physician Orders: PT Eval and Treat   Medical Diagnosis from Referral:   T14.90XA (ICD-10-CM) - Injury, unspecified, initial encounter   S22.21XA (ICD-10-CM) - Fracture of manubrium, initial encounter for closed fracture      Evaluation Date: 3/24/2022  Authorization Period Expiration: 6/26/2022  Plan of Care Expiration: 7/1/2022  Progress Note Due: 6/12/2022  Visit # / Visits authorized: 12/14  FOTO: 1/3  Time In: 2:40 pm (patient 10 minutes late)  Time Out: 3:20 pm  Total Billable Time: 35 minutes  Precautions: Standard   Discussed cervical CT results with Dr. Carlos, chief NSGY resident. Patient facet fx healed. Ok to remove c-collar. No follow ups needed at this time. Can return if symptoms change or fail to improve.   Subjective   Pt reports: he has been trying to work when he can, but has not followed up with his doctor (spine at [facility]). He reports that he still feels tight.     He was compliant with home exercise program.  Response to previous treatment: No adverse effect  Functional change: Improved cervical rotation  Pain: (cervical left sided sub-occipital) 6/10 pain;  Location: (R) rib underneath nipple line  Objective   **All charges billed as therapeutic exercise as per Medicaid rule.  Cervical AROM Pain/Dysfunction with movement   Flexion  (45-50 degrees) 30 degrees    Extension  (80 degrees) ~25 degrees    Right Side Bending  (40 degrees) 10 degrees    Left Side Bending  (40 degrees) 10 degrees    Right Rotation  (90 degrees) 35 degrees    Left Rotation  (90 degrees) 37 degrees    Cervical Protrusion Full    Cervical Retraction  "Moderately limited -   Cervical Flexion/Rotation:  25 degrees, bilaterally    Cheikh received therapeutic exercises to develop strength, endurance, ROM, flexibility and posture for 6 minutes including:  UBE, standing, Level 2, 3 minutes forward, 3 minutes backward    Cheikh received the following manual therapy techniques for 21 minutes:   MET Cervical Flexion/Rotation, 5x7" hold each side  Assessment measures taken (10 see above)  Cervical Retraction, Extension and Rotation with clinician assistance over edge of the table  Cervical Rotational Mobilizations, bilateral, Grade III, 10x each side    Home Exercises Provided and Patient Education Provided     Education provided:   - Home exercise program  Written Home Exercises Provided: yes.  Exercises were reviewed and Cheikh was able to demonstrate them prior to the end of the session.  Cheikh demonstrated fair  understanding of the education provided.     See EMR under Patient Instructions for exercises provided 3/28/2022.    Assessment   Cheikh has undergone 12 visits in the care of his cervical pain and impaired mobility after sustaining a fracture of C6-C7 in November 2021. At this time, his activity tolerance has increased and his fear of motion improved significantly, however continues to demonstrate  impaired thoracic and cervical range of motion deficits and impaired joint mobility restrictions that improve minimally after 12 visits in therapy care. Due to a plateau in progress, at this time it is most appropriate to discharge the patient with comprehensive Home exercise program at the conclusion of treatment, and shift focus onto shoulder if the patient is amenable to this treatment.    Cheikh Is progressing well towards his goals.   Pt prognosis is Fair.     Pt will continue to benefit from skilled outpatient physical therapy to address the deficits listed in the problem list box on initial evaluation, provide pt/family education and to maximize pt's level of " independence in the home and community environment.     Pt's spiritual, cultural and educational needs considered and pt agreeable to plan of care and goals.     Anticipated barriers to physical therapy: Compliance, Level of Understanding, Fear, Guarding, Catastrophizing Behavior    Goals: Short Term Goals: 4 weeks   1.) Patient will demonstrate independence in compliance and technique of home exercise program provided as per teach-back method of assessment. Not Met  2.) Patient will demonstrate fair to good scapular control in repositioning into proper alignment without cueing. Not Met  3.) Patient will reach to 120 degrees of shoulder flexion bilaterally to facilitate range of motion for functional ADL performance. Met  4.) Patient will improve cervical range of motion by +10 degrees in all planes to improve ability to navigate environment. Not Met  5.) Patient will report improvement in FOTO score by <60% reported disability.     Long Term Goals: 8 weeks   1.) Patient will demonstrate independence in compliance and technique of home exercise program provided as per teach-back method of assessment. Not Met  2.) Patient will demonstrate full thoracic mobility and range of motion in all planes. Not Met  3.) Patient will reach to 170 degrees of shoulder flexion bilaterally to facilitate range of motion for functional ADL performance. Not Met  4.) Patient will improve cervical range of motion by +20 degrees in all planes to improve ability to navigate environment. Not Met  5.) Patient will report improvement in FOTO score by <40% reported disability.  6.) Patient will lift 5# item overhead for 5x trials with good body mechanics, no fear or guarding and with <3/10 pain.  Met  Plan    Patient has one visit remaining, with likely discharge upon the conclusion of this final session.    Jena Munguia PT, DPT, OCS  License Number: [id number]

## 2022-06-21 ENCOUNTER — OFFICE VISIT (OUTPATIENT)
Dept: URGENT CARE | Facility: CLINIC | Age: 62
End: 2022-06-21
Payer: MEDICAID

## 2022-06-21 VITALS
HEIGHT: 69 IN | HEART RATE: 87 BPM | RESPIRATION RATE: 16 BRPM | OXYGEN SATURATION: 95 % | SYSTOLIC BLOOD PRESSURE: 150 MMHG | WEIGHT: 194 LBS | DIASTOLIC BLOOD PRESSURE: 78 MMHG | TEMPERATURE: 98 F | BODY MASS INDEX: 28.73 KG/M2

## 2022-06-21 DIAGNOSIS — L24.9 IRRITANT CONTACT DERMATITIS, UNSPECIFIED TRIGGER: Primary | ICD-10-CM

## 2022-06-21 PROCEDURE — 3008F BODY MASS INDEX DOCD: CPT | Mod: CPTII,S$GLB,,

## 2022-06-21 PROCEDURE — 1160F RVW MEDS BY RX/DR IN RCRD: CPT | Mod: CPTII,S$GLB,,

## 2022-06-21 PROCEDURE — 3078F DIAST BP <80 MM HG: CPT | Mod: CPTII,S$GLB,,

## 2022-06-21 PROCEDURE — 3008F PR BODY MASS INDEX (BMI) DOCUMENTED: ICD-10-PCS | Mod: CPTII,S$GLB,,

## 2022-06-21 PROCEDURE — 99213 PR OFFICE/OUTPT VISIT, EST, LEVL III, 20-29 MIN: ICD-10-PCS | Mod: S$GLB,,,

## 2022-06-21 PROCEDURE — 4010F PR ACE/ARB THEARPY RXD/TAKEN: ICD-10-PCS | Mod: CPTII,S$GLB,,

## 2022-06-21 PROCEDURE — 1159F PR MEDICATION LIST DOCUMENTED IN MEDICAL RECORD: ICD-10-PCS | Mod: CPTII,S$GLB,,

## 2022-06-21 PROCEDURE — 1159F MED LIST DOCD IN RCRD: CPT | Mod: CPTII,S$GLB,,

## 2022-06-21 PROCEDURE — 3077F SYST BP >= 140 MM HG: CPT | Mod: CPTII,S$GLB,,

## 2022-06-21 PROCEDURE — 99213 OFFICE O/P EST LOW 20 MIN: CPT | Mod: S$GLB,,,

## 2022-06-21 PROCEDURE — 3078F PR MOST RECENT DIASTOLIC BLOOD PRESSURE < 80 MM HG: ICD-10-PCS | Mod: CPTII,S$GLB,,

## 2022-06-21 PROCEDURE — 3077F PR MOST RECENT SYSTOLIC BLOOD PRESSURE >= 140 MM HG: ICD-10-PCS | Mod: CPTII,S$GLB,,

## 2022-06-21 PROCEDURE — 4010F ACE/ARB THERAPY RXD/TAKEN: CPT | Mod: CPTII,S$GLB,,

## 2022-06-21 PROCEDURE — 1160F PR REVIEW ALL MEDS BY PRESCRIBER/CLIN PHARMACIST DOCUMENTED: ICD-10-PCS | Mod: CPTII,S$GLB,,

## 2022-06-21 RX ORDER — TRIAMCINOLONE ACETONIDE 1 MG/G
OINTMENT TOPICAL 2 TIMES DAILY
Qty: 15 G | Refills: 0 | Status: SHIPPED | OUTPATIENT
Start: 2022-06-21 | End: 2022-06-28

## 2022-06-21 NOTE — PATIENT INSTRUCTIONS
- Rest.    - Drink plenty of fluids.    - Acetaminophen (tylenol) or Ibuprofen (advil,motrin) as directed as needed for fever/pain. Avoid tylenol if you have a history of liver disease. Do not take ibuprofen if you have a history of GI bleeding, kidney disease, or if you take blood thinners.   - Ibuprofen dosing for adults: 400 mg by mouth every 4-6 hours as needed. Max: 2400 mg/day; Info: use lowest effective dose, shortest effective treatment duration; give w/ food if GI upset occurs.  - Ibuprofen dosing for children: [6 mo-12 yo] Dose: 5-10 mg/kg/dose by mouth every 6-8h as needed; Max: 40 mg/kg/day; Info: use lower dose for fever <102.5 F, higher dose for fever >102.5 F; use shortest effective tx duration; give w/ food if GI upset occurs. [13 yo and older] Dose: 200-400 mg by mouth every 4-6 hours as needed; Max: 1200 mg/day; Info: use lowest effective dose, shortest effective tx duration; give w/ food if GI upset occurs.  - Tylenol dosing for adults: [By mouth route, immediate-release form] Dose: 325-1000 mg by mouth every 4-6h as needed; Max: 1 g/4h and 4 g/day from all sources. [By mouth route, extended-release form] Dose: 650-1300 mg Extended Release by mouth every 8h as needed; Max: 4 g/day from all sources.   - Tylenol dosing for children: 6-12 yo [ oral tablet/capsule ] Dose: 1 tab/cap by mouth every 4-6h as needed; Max: 5 tabs or caps/24h; Info: do not exceed 75 mg/kg/day, up to 1 g/4h and 4 g/day, from all sources. 13 yo and older [ oral tablet/capsule ] Dose: 1-2 tabs/caps by mouth every 4-6h as needed; Max: 10 tabs or caps/24h; Info: do not exceed 1 g/4h and 4 g/day from all sources.    - You must understand that you have received an Urgent Care treatment only and that you may be released before all of your medical problems are known or treated.   - You, the patient, will arrange for follow up care as instructed.   - If your condition worsens or fails to improve we recommend that you receive another  evaluation at the ER immediately or contact your PCP to discuss your concerns or return here.   - Follow up with your PCP or specialty clinic as directed in the next 1-2 weeks if not improved or as needed.  You can call [phone] to schedule an appointment with the appropriate provider.    If your symptoms do not improve or worsen, go to the emergency room immediately.

## 2022-06-21 NOTE — PROGRESS NOTES
"Subjective:       Patient ID: Cheikh Resendez is a 61 y.o. male.    Vitals:  height is 5' 9" (1.753 m) and weight is 88 kg (194 lb). His temperature is 97.8 °F (36.6 °C). His blood pressure is 150/78 (abnormal) and his pulse is 87. His respiration is 16 and oxygen saturation is 95%.     Chief Complaint: Insect Bite    Pt stated that he had a neck brace on for a while due to him breaking his neck in November . Pt stated that something may have crawled inside and bitten him. Pts pharmacy and meds have been updated . He states that he keeps scratching at the area and trying to scrub it off. Patient states he recently broke his neck and had to wear a neck brace that caused the initial lesion.     Insect Bite  This is a new problem. The current episode started 1 to 4 weeks ago. The problem occurs constantly. The problem has been unchanged. Associated symptoms include neck pain. Pertinent negatives include no abdominal pain, anorexia, arthralgias, change in bowel habit, chest pain, chills, congestion, coughing, diaphoresis, fatigue, fever, headaches, joint swelling, myalgias, nausea, numbness, rash, sore throat, swollen glands, urinary symptoms, vertigo, visual change, vomiting or weakness. Nothing aggravates the symptoms. He has tried nothing for the symptoms. The treatment provided mild relief.       Constitution: Negative for chills, sweating, fatigue and fever.   HENT: Negative for congestion and sore throat.    Neck: Positive for neck pain.   Cardiovascular: Negative for chest pain.   Respiratory: Negative for cough.    Gastrointestinal: Negative for abdominal pain, nausea and vomiting.   Musculoskeletal: Negative for joint pain, joint swelling and muscle ache.   Skin: Negative for rash and erythema.   Neurological: Negative for history of vertigo, headaches and numbness.       Objective:      Physical Exam   Constitutional: He is oriented to person, place, and time. He appears well-developed.      Comments:Patient " sits comfortably in exam chair. Answers questions in complete sentences. Does not show any signs of distress or discoloration.        HENT:   Head: Normocephalic and atraumatic. Head is without abrasion, without contusion and without laceration.   Ears:   Right Ear: External ear normal.   Left Ear: External ear normal.   Nose: Nose normal.   Mouth/Throat: Oropharynx is clear and moist and mucous membranes are normal.   Eyes: Conjunctivae, EOM and lids are normal. Pupils are equal, round, and reactive to light.   Neck: Trachea normal and phonation normal. Neck supple.          Comments: Non fluctuant 1 cm mass to the posterior neck. There is a rash noted inferiorly to the lesion.    Cardiovascular: Normal rate, regular rhythm and normal heart sounds.   Pulmonary/Chest: Effort normal and breath sounds normal. No stridor. No respiratory distress.   Musculoskeletal: Normal range of motion.         General: Normal range of motion.   Lymphadenopathy:     He has no cervical adenopathy.        Right cervical: No superficial cervical, no deep cervical and no posterior cervical adenopathy present.       Left cervical: No superficial cervical, no deep cervical and no posterior cervical adenopathy present.   Neurological: He is alert and oriented to person, place, and time.   Skin: Skin is warm, dry, intact and no rash. Capillary refill takes less than 2 seconds. No abrasion, No burn, No bruising, No erythema and No ecchymosis   Psychiatric: His speech is normal and behavior is normal. Judgment and thought content normal.   Nursing note and vitals reviewed.          Assessment:       1. Irritant contact dermatitis, unspecified trigger          Plan:       Lesion likely from friction to the neck brace and then worsened because patient keeps scratching at it and irritated it. Explained this to patient told him to avoid scratching at the area and to use steroid cream to area 2 times a day for 7 days.     Irritant contact  dermatitis, unspecified trigger  -     triamcinolone acetonide 0.1% (KENALOG) 0.1 % ointment; Apply topically 2 (two) times daily. for 7 days  Dispense: 15 g; Refill: 0                 Patient Instructions   - Rest.    - Drink plenty of fluids.    - Acetaminophen (tylenol) or Ibuprofen (advil,motrin) as directed as needed for fever/pain. Avoid tylenol if you have a history of liver disease. Do not take ibuprofen if you have a history of GI bleeding, kidney disease, or if you take blood thinners.   - Ibuprofen dosing for adults: 400 mg by mouth every 4-6 hours as needed. Max: 2400 mg/day; Info: use lowest effective dose, shortest effective treatment duration; give w/ food if GI upset occurs.  - Ibuprofen dosing for children: [6 mo-10 yo] Dose: 5-10 mg/kg/dose by mouth every 6-8h as needed; Max: 40 mg/kg/day; Info: use lower dose for fever <102.5 F, higher dose for fever >102.5 F; use shortest effective tx duration; give w/ food if GI upset occurs. [11 yo and older] Dose: 200-400 mg by mouth every 4-6 hours as needed; Max: 1200 mg/day; Info: use lowest effective dose, shortest effective tx duration; give w/ food if GI upset occurs.  - Tylenol dosing for adults: [By mouth route, immediate-release form] Dose: 325-1000 mg by mouth every 4-6h as needed; Max: 1 g/4h and 4 g/day from all sources. [By mouth route, extended-release form] Dose: 650-1300 mg Extended Release by mouth every 8h as needed; Max: 4 g/day from all sources.   - Tylenol dosing for children: 6-10 yo [ oral tablet/capsule ] Dose: 1 tab/cap by mouth every 4-6h as needed; Max: 5 tabs or caps/24h; Info: do not exceed 75 mg/kg/day, up to 1 g/4h and 4 g/day, from all sources. 11 yo and older [ oral tablet/capsule ] Dose: 1-2 tabs/caps by mouth every 4-6h as needed; Max: 10 tabs or caps/24h; Info: do not exceed 1 g/4h and 4 g/day from all sources.    - You must understand that you have received an Urgent Care treatment only and that you may be released before  all of your medical problems are known or treated.   - You, the patient, will arrange for follow up care as instructed.   - If your condition worsens or fails to improve we recommend that you receive another evaluation at the ER immediately or contact your PCP to discuss your concerns or return here.   - Follow up with your PCP or specialty clinic as directed in the next 1-2 weeks if not improved or as needed.  You can call [phone] to schedule an appointment with the appropriate provider.    If your symptoms do not improve or worsen, go to the emergency room immediately.

## 2022-08-17 ENCOUNTER — OCCUPATIONAL HEALTH (OUTPATIENT)
Dept: URGENT CARE | Facility: CLINIC | Age: 62
End: 2022-08-17

## 2022-08-17 DIAGNOSIS — Z00.00 ENCOUNTER FOR PHYSICAL EXAMINATION: Primary | ICD-10-CM

## 2022-08-17 PROCEDURE — 80306 DRUG TEST PRSMV INSTRMNT: CPT | Mod: S$GLB,,, | Performed by: PHYSICIAN ASSISTANT

## 2022-08-17 PROCEDURE — 99499 UNLISTED E&M SERVICE: CPT | Mod: S$GLB,,, | Performed by: PHYSICIAN ASSISTANT

## 2022-08-17 PROCEDURE — 99499 DOT PHYSICAL: ICD-10-PCS | Mod: S$GLB,,, | Performed by: PHYSICIAN ASSISTANT

## 2022-08-17 PROCEDURE — 80306 OOH DOT DRUG SCREEN: ICD-10-PCS | Mod: S$GLB,,, | Performed by: PHYSICIAN ASSISTANT

## 2022-11-17 ENCOUNTER — TELEPHONE (OUTPATIENT)
Dept: ORTHOPEDICS | Facility: CLINIC | Age: 62
End: 2022-11-17
Payer: MEDICAID

## 2022-11-17 DIAGNOSIS — M25.569 KNEE PAIN, UNSPECIFIED CHRONICITY, UNSPECIFIED LATERALITY: Primary | ICD-10-CM

## 2022-11-17 NOTE — PROGRESS NOTES
Subjective:      Patient ID: Cheikh Resendez is a 62 y.o. male.    Chief Complaint: Pain of the Left Knee (Patient presents today as a new patient complaining he has pain in his left knee from falling down the steps a few months back. )      HPI  ([place])    History of left knee scope years ago.     He has a fall down his stairs while moving a washing machine a couple months ago and he notes constant left knee pain since that time that is worse with standing and walking. He has locking, catching, and giving way of left knee. He has not seen any significant improvement since his injury. He rates his pain as an 8 on a scale of 1-10. Pain is aching. He cannot change directions or pivot on left knee.     He is taking voltaren. He is on chronic oxycodone (PCP)- history of cervical spine fracture and bilateral shoulder surgery. No PT, injections, or knee brace. Knee surgery as above.       History reviewed. No pertinent past medical history.      Current Outpatient Medications:     butalbital-acetaminophen-caffeine -40 mg (FIORICET, ESGIC) -40 mg per tablet, Take 1 tablet by mouth every 4 (four) hours as needed., Disp: , Rfl:     cyanocobalamin 1,000 mcg/mL injection, INJECT 1ml INTRAMUSCULARLY EVERY MONTH, Disp: , Rfl:     diclofenac (VOLTAREN) 75 MG EC tablet, Take 75 mg by mouth 2 (two) times daily as needed., Disp: , Rfl:     gabapentin (NEURONTIN) 600 MG tablet, Take 600 mg by mouth 3 (three) times daily., Disp: , Rfl:     LIDOcaine (LIDODERM) 5 %, lidocaine 5 % topical patch  APPLY ONE PATCH TOPICALLY ONCE EVERY DAY (MAY WEAR UP TO12 HOURS), Disp: , Rfl:     losartan (COZAAR) 50 MG tablet, Take 50 mg by mouth once daily., Disp: , Rfl:     oxyCODONE (ROXICODONE) 10 mg Tab immediate release tablet, , Disp: , Rfl:     sildenafil (REVATIO) 20 mg Tab, sildenafil (pulmonary hypertension) 20 mg tablet  TAKE 1 TO 3 TABLETS BY MOUTH AS NEEDED, Disp: , Rfl:     tadalafiL (CIALIS) 10 MG tablet, TAKE 1 TABLET BY  "MOUTH AS DIRECTED, Disp: , Rfl:     testosterone (TESTIM) 50 mg/5 gram (1 %) Gel, APPLY ONE-HALF TO ONE INCH TO UPPER ARM DAILY, Disp: , Rfl:     valACYclovir (VALTREX) 1000 MG tablet, TAKE 1 TABLET BY MOUTH TWICE DAILY AS NEEDED FOR FEVER BLISTER, Disp: , Rfl:     triamcinolone acetonide 0.1% (KENALOG) 0.1 % ointment, Apply topically 2 (two) times daily. for 7 days, Disp: 15 g, Rfl: 0    Review of patient's allergies indicates:  No Known Allergies    Review of Systems   Constitutional: Negative for chills, fever, night sweats and weight gain.   Gastrointestinal:  Negative for bowel incontinence, nausea and vomiting.   Genitourinary:  Negative for bladder incontinence.   Neurological:  Negative for disturbances in coordination and loss of balance.         Objective:        Ht 5' 9" (1.753 m)   Wt 93.4 kg (206 lb)   BMI 30.42 kg/m²     Ortho/SPM Exam    Body habitus is normal.   The patient walks with a limp.      RIGHT KNEE EXAM:  Resisted SLR negative.   The skin over the knee is intact.  Knee effusion none   Tendernes is located  n/a  Range of motion- Flexion full, Extension full,     Ligament exam:   MCL intact   Lachman intact              Post sag intact    LCL intact    Patellar apprehension negative.  Popliteal cyst negative  Patellar crepitation absent.  Flexion/pinch negative.    Motor normal 5/5 strength in all tested muscle groups.   Sensory normal.      LEFT KNEE EXAM:  Resisted SLR negative.   The skin over the knee is intact.  Knee effusion not significant   Tendernes is located  n/a  Range of motion- Flexion 120 deg with pain, Extension 0 deg,     Ligament exam:   MCL intact   Lachman intact              Post sag intact    LCL intact    Patellar apprehension negative.  Popliteal cyst negative  Patellar crepitation absent.  Flexion/pinch negative.    Motor normal 5/5 strength in all tested muscle groups.   Sensory normal.      XRAY INTERPRETATION:  X-rays of bilateral knees dated 11/18/22 are " personally reviewed and show mild medial joint space narrowing left > right knee.         Assessment:       Encounter Diagnoses   Name Primary?    Primary osteoarthritis of left knee     Fall, initial encounter Yes          Plan:       Cheikh was seen today for pain.    Diagnoses and all orders for this visit:    Fall, initial encounter  -     Ambulatory referral/consult to Physical/Occupational Therapy; Future  -     Large Joint Aspiration/Injection: L knee    Primary osteoarthritis of left knee  -     Ambulatory referral/consult to Orthopedics  -     Ambulatory referral/consult to Physical/Occupational Therapy; Future  -     Large Joint Aspiration/Injection: L knee      History of left knee scope years ago.     He has a fall down his stairs while moving a washing machine a couple months ago and he notes constant left knee pain since that time that is worse with standing and walking. He has locking, catching, and giving way of left knee.     XRs of bilateral knees show mild medial joint space narrowing left > right knee. He may have meniscal tear as well.     Treatment options reviewed with patient along with above bilateral knee xrays. Following plan made:     - LEFT knee injection done without complication. See procedure note.   - PT orders for left knee sent to [facility].   - Continue on voltaren from other providers. Reviewed dosing and side effects. Take with food.   - Given hinged knee brace. Will wear this prn comfort/support with prolonged walking.     Follow up in about 3 months (around 2/18/2023).

## 2022-11-17 NOTE — TELEPHONE ENCOUNTER
Called pt to inform him he has an xray appt prior to his office visit. No answer left message on voicemail.   Agree with the above.  Trend lipase, continue abx, monitor vital signs.   Switched to zosyn.   -telemetry monitoring  NPO  will need daily INR and heparin bridge once INR<2.5  -f/u consultant recommendations and Ultrasound interpretation.

## 2022-11-17 NOTE — TELEPHONE ENCOUNTER
----- Message from Sumaya Marroquin PA-C sent at 11/16/2022  9:18 PM CST -----  He has appt on Friday and needs bilateral knee XRs prior to seeing me.     Thanks.

## 2022-11-18 ENCOUNTER — OFFICE VISIT (OUTPATIENT)
Dept: ORTHOPEDICS | Facility: CLINIC | Age: 62
End: 2022-11-18
Payer: MEDICAID

## 2022-11-18 VITALS — WEIGHT: 206 LBS | BODY MASS INDEX: 30.51 KG/M2 | HEIGHT: 69 IN
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
## 2022-11-18 DIAGNOSIS — M17.12 PRIMARY OSTEOARTHRITIS OF LEFT KNEE: ICD-10-CM

## 2022-11-18 PROCEDURE — 1159F MED LIST DOCD IN RCRD: CPT | Mod: CPTII,,, | Performed by: PHYSICIAN ASSISTANT

## 2022-11-18 PROCEDURE — 1160F RVW MEDS BY RX/DR IN RCRD: CPT | Mod: CPTII,,, | Performed by: PHYSICIAN ASSISTANT

## 2022-11-18 PROCEDURE — 20610 LARGE JOINT ASPIRATION/INJECTION: L KNEE: ICD-10-PCS | Mod: S$PBB,LT,, | Performed by: PHYSICIAN ASSISTANT

## 2022-11-18 PROCEDURE — 99213 PR OFFICE/OUTPT VISIT, EST, LEVL III, 20-29 MIN: ICD-10-PCS | Mod: 25,S$PBB,, | Performed by: PHYSICIAN ASSISTANT

## 2022-11-18 PROCEDURE — 99214 OFFICE O/P EST MOD 30 MIN: CPT | Mod: PBBFAC,PN | Performed by: PHYSICIAN ASSISTANT

## 2022-11-18 PROCEDURE — 99999 PR PBB SHADOW E&M-EST. PATIENT-LVL IV: ICD-10-PCS | Mod: PBBFAC,,, | Performed by: PHYSICIAN ASSISTANT

## 2022-11-18 PROCEDURE — 20610 DRAIN/INJ JOINT/BURSA W/O US: CPT | Mod: S$PBB,LT,, | Performed by: PHYSICIAN ASSISTANT

## 2022-11-18 PROCEDURE — 20610 DRAIN/INJ JOINT/BURSA W/O US: CPT | Mod: PBBFAC,PN | Performed by: PHYSICIAN ASSISTANT

## 2022-11-18 PROCEDURE — 3008F BODY MASS INDEX DOCD: CPT | Mod: CPTII,,, | Performed by: PHYSICIAN ASSISTANT

## 2022-11-18 PROCEDURE — 4010F PR ACE/ARB THEARPY RXD/TAKEN: ICD-10-PCS | Mod: CPTII,,, | Performed by: PHYSICIAN ASSISTANT

## 2022-11-18 PROCEDURE — 3008F PR BODY MASS INDEX (BMI) DOCUMENTED: ICD-10-PCS | Mod: CPTII,,, | Performed by: PHYSICIAN ASSISTANT

## 2022-11-18 PROCEDURE — 99999 PR PBB SHADOW E&M-EST. PATIENT-LVL IV: CPT | Mod: PBBFAC,,, | Performed by: PHYSICIAN ASSISTANT

## 2022-11-18 PROCEDURE — 1160F PR REVIEW ALL MEDS BY PRESCRIBER/CLIN PHARMACIST DOCUMENTED: ICD-10-PCS | Mod: CPTII,,, | Performed by: PHYSICIAN ASSISTANT

## 2022-11-18 PROCEDURE — 1159F PR MEDICATION LIST DOCUMENTED IN MEDICAL RECORD: ICD-10-PCS | Mod: CPTII,,, | Performed by: PHYSICIAN ASSISTANT

## 2022-11-18 PROCEDURE — 4010F ACE/ARB THERAPY RXD/TAKEN: CPT | Mod: CPTII,,, | Performed by: PHYSICIAN ASSISTANT

## 2022-11-18 PROCEDURE — 99213 OFFICE O/P EST LOW 20 MIN: CPT | Mod: 25,S$PBB,, | Performed by: PHYSICIAN ASSISTANT

## 2022-11-18 RX ORDER — TRIAMCINOLONE ACETONIDE 40 MG/ML
40 INJECTION, SUSPENSION INTRA-ARTICULAR; INTRAMUSCULAR
Status: DISCONTINUED | OUTPATIENT
Start: 2022-11-18 | End: 2022-11-18 | Stop reason: HOSPADM

## 2022-11-18 RX ADMIN — TRIAMCINOLONE ACETONIDE 40 MG: 40 INJECTION, SUSPENSION INTRA-ARTICULAR; INTRAMUSCULAR at 01:11

## 2022-11-18 NOTE — PROCEDURES
Large Joint Aspiration/Injection: L knee    Date/Time: 11/18/2022 1:00 PM  Performed by: Sumaya Marroquin PA-C  Authorized by: Sumaya Marroquin PA-C     Consent Done?:  Yes (Verbal)  Timeout: prior to procedure the correct patient, procedure, and site was verified    Location:  Knee  Site:  L knee  Medications:  40 mg triamcinolone acetonide 40 mg/mL     PROCEDURE NOTE:  LEFT KNEE INJECTION    I have explained the risks, benefits, and alternatives of the procedure in detail.  The patient voices understanding and all questions have been answered.  The patient agrees to proceed as planned.    After a sterile prep of the skin using chloraprep one step, the area was sprayed with local topical anesthetic and then cleaned with alcohol. The LEFT knee was injected through an inferior lateral approach with a combination of 2 cc 1% plain xylocaine and 40mg triamcinolone.  The patient is cautioned that immediate relief of pain is secondary to the local anesthetic and will be temporary. After the anesthetic wears off there may be a increase in pain that may last for a few hours or a few days and they should use ice to help alleviate this this pain.     If patient is diabetic, post injection elevation of blood sugar was discussed. Patient is to check blood sugar regularly and call PCP with any issues.     Patient tolerated the procedure well.

## 2022-11-18 NOTE — PATIENT INSTRUCTIONS
It was nice to meet you today! I am sorry that you are hurting so much.     You have some wear and tear in your left knee and this is likely what is causing your pain.     The injection that I did today should give you some good relief of pain. It is normal to have some increased soreness over the next few days after an injection. Put ice on it and elevate. This will get better.     Wear the knee brace as needed for prolonged walking. This should give you added support and help with pain.     I sent physical therapy orders to [facility] ([place]). They should call you to set up, but if not you can call [phone].     I will see you back in 3 months, but please stay in touch and call me if you need anything. You can also send me a message in [facility].     Sumaya   [phone]

## 2022-12-09 ENCOUNTER — CLINICAL SUPPORT (OUTPATIENT)
Dept: REHABILITATION | Facility: HOSPITAL | Age: 62
End: 2022-12-09
Payer: MEDICAID

## 2022-12-09 DIAGNOSIS — M25.562 ACUTE PAIN OF LEFT KNEE: ICD-10-CM

## 2022-12-09 DIAGNOSIS — M17.12 PRIMARY OSTEOARTHRITIS OF LEFT KNEE: ICD-10-CM
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
## 2022-12-09 PROCEDURE — 97161 PT EVAL LOW COMPLEX 20 MIN: CPT | Mod: PN

## 2022-12-09 NOTE — PLAN OF CARE
"[facility]  Physical Therapy Initial Evaluation    Name: Cheikh Resendez  Clinic Number: [id number]    Therapy Diagnosis:   Encounter Diagnoses   Name Primary?    Primary osteoarthritis of left knee     Fall, initial encounter     Acute pain of left knee      Physician: Sumaya Marroquin PA-C    Physician Orders: PT Eval and Treat  Medical Diagnosis:   M17.12 (ICD-10-CM) - Primary osteoarthritis of left knee   W19.XXXA (ICD-10-CM) - Fall, initial encounter     Evaluation Date: 12/9/2022  Authorization Period Expiration: 12/31/2022  Plan of Care Certification Period: 12/9/2022 to 01/27/2023  Visit # / Visits authorized: 1/20  FOTO: 1/5  PTA visits: 0/5    Time In: 1010  Time Out: 1045  Total Billable Time: 40 minutes (1 LCE)  Precautions: standard    Subjective   Cheikh reports to OPPT with primary complaint of left knee pain.  DOI: 3 weeks ago.  MAURICIO: Moving washing machine with a beck to upstairs apartment.  Fell on left knee. Mild swelling after the fall.  History of left knee surgery 2005.  No issues with knee prior to the fall.  Pain mostly medially and laterally. Aggravated by pivoting, getting on ground, standing/walking on it for too long.  Denies instability, mechanical symptoms.      No past medical history on file.  Cheikh Resendez  has no past surgical history on file.    Cheikh has a current medication list which includes the following prescription(s): butalbital-acetaminophen-caffeine -40 mg, cyanocobalamin, diclofenac, gabapentin, lidocaine, losartan, oxycodone, sildenafil, tadalafil, testosterone, triamcinolone acetonide 0.1%, and valacyclovir.    Review of patient's allergies indicates:  No Known Allergies     Imaging:    knee radiographs: Findings: "There is medial compartment joint space loss bilaterally.  There is no fracture, dislocation, or bony erosion."    Prior Therapy:  none  Social History:  Lives at home in apartment above his shed.   Occupation:   , " Owner of a saw mill.   Prior Level of Function:  See above  Current Level of Function:  Wearing brace intermittent.     Pain:  Current 3/10, worst 6/10, best 1/10   Location: left knee  Description: Aching, Grabbing, and Tight    Pts goals:  1. To be rid of his knee pain.     2. To improve his strength.     Objective     Palpation:  TTP lateral joint line left knee.  1+ effusion.   Posture:  Holds hip in external rotation position in supine.     Gross Movement Analysis:  - Gait:    Non-antalgic pattern on smooth level surface.   - Squats:  Mild quad dominant pattern.  Able to achieve 70 degree depth without pain.       Range of Motion:   LE Right Left   Hip flexion 110 110   Hip abduction WNL WNL   Hip ER WNL WNL   Hip IR  WNL WNL   Hip extension 10 10   Knee 0 - 130 0 - 120; pain with terminal flexion   Ankle DF WNL WNL     Lower Extremity Strength                 LE           Right           Left   Hip flexion: 5/5 4/5   Hip abduction 4/5 4/5   Hip extension 5/5 4/5   Knee flexion 5/5 4/5   Knee extension 5/5 4/5   Ankle dorsiflexion: 5/5 5/5   Ankle plantarflexion: 5/5 5/5     Special Tests:   Right Left   Valgus Stress Test - -   Varus Stress test - -   Lachman's test - -   Posterior drawer - -   Anterior drawer - -   Jorge A's Test - + pain; lateral bias     Joint Mobility: 2 quadrante bilateral patellar medially/laterally and superior/inferiorly   Sensation:  intact light touch sensation to BLE throughout L2-S2 dermatomal pattern    Meniscal tear CPR:  (4/5 LR+= 4.28, 5/5 LR+ 11.2)  History of catching/locking reported --> N  Joint line tenderness --> Y  Pain with forced knee hyperextension --> N  Pain with maximal knee flexion --> Y  Pain/audible click with Jorge A test --> Y      CMS Impairment/Limitation/Restriction for FOTO Knee Survey    Therapist reviewed FOTO scores for Cheikh Maxwellnatanaeljeaneth on 12/9/2022.   FOTO documents entered into [employer] - see Media section.    Limitation Score: 64%  Predicted  Limitation Score: 43%  LEFS: 19/80         TREATMENT   Treatment Time In: 1030  Treatment Time Out: 1045  Total Treatment time separate from Evaluation time:15'    Cheikh received therapeutic exercises to develop strength and ROM for 15 minutes, including:  Straight leg raise 10x  S/l hip abd 10x  Long arc quad red tb 10x (pain-free range of motion)  dL heel raise 10x  Quarter squats 10x      Home Exercises and Patient Education Provided  Education provided re:   - PT diagnosis and recommended treatment course.  - home exercise program     Written Home Exercises Provided: yes.  Exercises were reviewed and Cheikh was able to demonstrate them prior to the end of the session.   Pt received a written copy of exercises to perform at home. Cheikh demonstrated good  understanding of the education provided.     See EMR under patient instructions for exercises given.     Assessment   Cheikh is a 62 y.o. male referred to outpatient Physical Therapy with a medical diagnosis of 1) Primary osteoarthritis of left knee, 2) fall. Pt presents with ~ 1 month history of left knee pain following a fall.  History of left knee surgery in 2005.  Clinical examination today reveals decreased left knee range of motion, strength, mild joint effusion, and decreased tolerance to standing/work demands.  Normal ligamentous testing.  Differential dx to include meniscal injury, articular cartilage lesion.  Would benefit from trial of OPPT to address impairments/deficits.      Pt prognosis is Excellent.   Pt will benefit from skilled outpatient Physical Therapy to address the deficits stated above and in the chart below, provide pt/family education, and to maximize pt's level of independence.     Plan of care discussed with patient: Yes  Pt's spiritual, cultural and educational needs considered and patient is agreeable to the plan of care and goals as stated below:     Anticipated Barriers for therapy: none    Medical Necessity is demonstrated by the  following  History  Co-morbidities and personal factors that may impact the plan of care Co-morbidities:   Arthritis, Back pain, Headaches, High Blood Pressure, Previous accidents, Prior  Surgery    Personal Factors:   lifestyle     moderate   Examination  Body Structures and Functions, activity limitations and participation restrictions that may impact the plan of care Body Regions:   neck  lower extremities    Body Systems:    ROM  strength  balance  gait  transfers  motor control  edema    Participation Restrictions:   none    Activity limitations:   Learning and applying knowledge  no deficits    General Tasks and Commands  no deficits    Communication  no deficits    Mobility  lifting and carrying objects  walking  Stair negotiation, floor transfers    Self care  no deficits    Domestic Life  doing house work (cleaning house, washing dishes, laundry)    Interactions/Relationships  no deficits    Life Areas  no deficits    Community and Social Life  community life  recreation and leisure         moderate   Clinical Presentation stable and uncomplicated low   Decision Making/ Complexity Score: low     Goals:  Short Term Goals: 3 weeks   1.  Patient will demonstrate understanding and compliance of home exercise program.   2.  Patient will demonstrate 90 degree depth squat without pain and limb symmetry for improved tolerance to work demands.   3.  Patient will report > 9 point improvement on LEFS.    Long Term Goals: 6- 8 weeks   1.  Patient will demonstrate ability to transfer from half-kneel/floor to standing without pain.   2.  Patient will report <40% limitation on FOTO survey.   3.  Patient will demonstrate normal stair negotiation ascending/descending with non-antalgic pattern.  4.  Patient will report tolerance to all work activities without need for rest break because of left knee during the day.      Plan   Certification Period/Plan of care expiration: 12/9/2022 to 01/27/2023.    Outpatient Physical  Therapy 12 times kasia the course of 8 weeks to include the following interventions: Electrical Stimulation IFC/NMES, Gait Training, Manual Therapy, Moist Heat/ Ice, Neuromuscular Re-ed, Orthotic Management and Training, Patient Education, Self Care, Therapeutic Activities, and Therapeutic Exercise, IASTM, Dry Needling.     Andrews Merino, PT. DPT, OCS

## 2023-06-29 DIAGNOSIS — M54.2 CERVICALGIA: ICD-10-CM

## 2023-06-29 DIAGNOSIS — S12.691D OTHER NONDISPLACED FRACTURE OF SEVENTH CERVICAL VERTEBRA, SUBSEQUENT ENCOUNTER FOR FRACTURE WITH ROUTINE HEALING: ICD-10-CM

## 2023-06-29 DIAGNOSIS — S12.591D OTHER CLOSED NONDISPLACED FRACTURE OF SIXTH CERVICAL VERTEBRA WITH ROUTINE HEALING, SUBSEQUENT ENCOUNTER: Primary | ICD-10-CM

## 2023-07-11 ENCOUNTER — CLINICAL SUPPORT (OUTPATIENT)
Dept: REHABILITATION | Facility: HOSPITAL | Age: 63
End: 2023-07-11
Payer: MEDICAID

## 2023-07-11 DIAGNOSIS — M54.2 NECK PAIN: ICD-10-CM

## 2023-07-11 PROCEDURE — 97162 PT EVAL MOD COMPLEX 30 MIN: CPT | Mod: PN

## 2023-07-17 PROBLEM — M54.2 NECK PAIN: Status: ACTIVE | Noted: 2023-07-17

## 2023-07-17 NOTE — PLAN OF CARE
[facility]  Physical Therapy Initial Evaluation    Name: Cheikh Resendez  Clinic Number: [id number]    Therapy Diagnosis:   Encounter Diagnosis   Name Primary?    Neck pain      Physician: SUZIE Benson    Physician Orders: PT Eval and Treat   Medical Diagnosis:   S12.591D (ICD-10-CM) - Other closed nondisplaced fracture of sixth cervical vertebra with routine healing, subsequent encounter   M54.2 (ICD-10-CM) - Cervicalgia   S12.691D (ICD-10-CM) - Other nondisplaced fracture of seventh cervical vertebra, subsequent encounter for fracture with routine healing     Evaluation Date: 7/11/2023  Authorization Period:  12/31/2023  Plan of Care Certification Period: 7/11/2023 to 09/20/2023  Visit # / Visits authorized: 1/TBD     Time In: 1220  Time Out: 1255  Total Billable Time: 35 minutes  Precautions: history of traumatic neck injury.     Subjective   Cheikh reports to OPPT today with referral for neck pain.  History of cervical fracture sustained while working under a truck.  Fractured his sternum, fractured his neck in two places.  Wore a cervical collar for a brief time.  Self-discharged.  Went to PT with minimal improvement.  Recently followed up with neurosurgeon but cannot recall any specific details about that appointment.  Prior complaint today upon arrival is neck pain and lack of mobility in his neck. Difficulty sleeping due to neck pain. When asked about upper extremity paresthesias, weakness, and headaches, patient confirm all three but cannot given any specifics about their distribution, frequency, ect.   Does voice history of bilateral shoulder surgery. Left RC repair.  Cannot recall the procedure he had performed on the right shoulder.        No past medical history on file.  Cheikh Resendez  has no past surgical history on file.    Cheikh has a current medication list which includes the following prescription(s): butalbital-acetaminophen-caffeine -40 mg,  cyanocobalamin, diclofenac, gabapentin, lidocaine, losartan, oxycodone, sildenafil, tadalafil, testosterone, triamcinolone acetonide 0.1%, and valacyclovir.    Review of patient's allergies indicates:  No Known Allergies     Imaging:   Cervical MRI: Impression:  1.   Known fractures of the C6 and C7 facets.   2.   Linear increased T2 signal in the left T1 facet raises question of osseous contusion /nondisplaced fracture.   3.   Mild edema of the superior T1 and T2 endplates with associated paraspinous or prevertebral edema suggests acute degenerative changes rather than osseous contusion.   4.   Extensive  paraspinous edema in the bilateral posterior and lateral paraspinous musculature, left greater than right.   5.   Multilevel degenerative changes with moderate to severe facet arthropathy as detailed above. No cord signal abnormality.      Cervical CT: Feb 2023: Impression:  1.   Expected interval bony healing of the right C6 and C7 facet fractures with residual osseous fragment along the inferior aspect of the right inferior facet of C6, within normal limits. No persistent malalignment.   2.   No acute fracture or subluxation of the cervical spine.   3.   Unchanged multilevel cervical spondylosis without high-grade thecal sac or neural foraminal narrowing.      Right shoulder MRI: Impression:  1. Postsurgical changes of rotator cuff repair.  No full-thickness re-tear.  Tendinosis and partial-thickness tear of the subscapularis tendon.  Tendinosis of infraspinatus tendon.  Mild loss of supraspinatus muscle bulk.  2. Biceps tendinosis and tenosynovitis with medial subluxation of the biceps tendon into the substance of the subscapularis tendon.  3. Circumferential fraying of the labrum.  4. Additional findings above.      Prior Therapy:  Yes; same problem in 2022.   Social History:  Lives at home with family.   Occupation:   , Owner of a saw mill.   Prior Level of Function:  No neck pain prior to event that  resulted in cervical fracture.   Current Level of Function:  See above.     Pain:  Current 5/10, worst 8/10, best 5/10   Location: bilateral (left > right) neck pain  Description: 'It just hurts'  Treatment strategies:  Lidocaine patches to help him sleep.  No exercise.     Pts goals: 1. To be rid of his neck pain.     Objective     Palpation:  TTP left suboccipital muscles, left cervical paraspinals.  Noted left supraspinous fossa atrophy    Posture:   Holds upper cervical spine in extension     Cervical range of motion:   Flexion= 40 pain, extension = 45 pain, RSB= 30 pain, LSB=25 pain, LR = 40 pain, RR= 40 pain   Cervical flexion-rotation test: (+) bilateral     Cervical strength:   NFET:  Deferred    Shoulder range of motion:    Right: Passive = active.  FE= 150, external rotation at 0= 50, ExR scap plane = 60 degrees   Left: Passive > active.  FE active 90, abd 80    Upper Extremity Strength:  LUE  RUE    Shoulder flexion: 5/5 Shoulder flexion: 3+/5*   Shoulder Abduction: 5/5 Shoulder abduction: 3-/5*   Shoulder ER 5/5 Shoulder ER 3/5*   Shoulder IR 5/5 Shoulder IR 4/5*   Elbow flexion: 5/5 Elbow flexion: 3+/5   Elbow extension: 5/5 Elbow extension: 3+/5   Wrist flexion: 5/5 Wrist flexion: 4/5   Wrist extension: 5/5 Wrist extension: 4/5   Thumb extension 5/5 Thumb extension 4/5   5th digit abduction 5/5 5th digit abduction 4/5   *denotes pain    Shoulder special tests:    (+) Drop arm sign   (-) external rotation lag sign   (-) Belly press sign    Cervicothoracic Joint Mobility: pain right cervical side glides C3-T1 with   ULTT:  Deferred   Reflexes:  (-) Weston's sign, 2+ bilateral DTRs throughout.   Sensation:  Normal light touch sensation C4-T2 throughout BUE      CMS Impairment/Limitation/Restriction for FOTO Neck Survey    Therapist reviewed FOTO scores for Cheikh RAMIREZ Dipti on 7/11/2023.   FOTO documents entered into [employer] - see Media section.    Limitation Score: 54% --> 46%  NDI: 44%         TREATMENT    Not today.     Home Exercises and Patient Education Provided  Education provided re:   - PT diagnosis and recommended treatment course    Written Home Exercises Provided: not today.      Assessment   Cheikh is a 62 y.o. male referred to outpatient Physical Therapy with a medical diagnosis of M54.2 (ICD-10-CM) - Cervicalgia. Pt presents with neck pain, decreased range of motion.  History of traumatic neck injury several years ago including the fracture of 6th/7th vertebrae.  Latest imaging shows healing of these fractures.  Continues to have neck pain and range of motion restrictions.  Also voices LUE weakness but history of RC repair.  Clinical examination today reveals decreased cervical range of motion, cervical strength, left shoulder/elbow/wrist strength, and poor tolerance to static neck positioning.  Denies 5 D's and 3 N's.  Will begin OPPT course to address impairments.     Pt prognosis is Fair.   Pt will benefit from skilled outpatient Physical Therapy to address the deficits stated above and in the chart below, provide pt/family education, and to maximize pt's level of independence.     Plan of care discussed with patient: Yes  Pt's spiritual, cultural and educational needs considered and pt agreeable to plan of care and goals as stated below:     Anticipated Barriers for therapy: coping style    Medical Necessity is demonstrated by the following  History  Co-morbidities and personal factors that may impact the plan of care Co-morbidities:   Arthritis, Back pain, Headaches, Previous accidents, Prior Surgery, Sleep  dysfunction, Visual Impairment    Personal Factors:   age  coping style  lifestyle     high   Examination  Body Structures and Functions, activity limitations and participation restrictions that may impact the plan of care Body Regions:   neck  upper extremities    Body Systems:    gross symmetry  ROM  strength  gait  transfers  motor control    Participation Restrictions:   See  above    Activity limitations:   Learning and applying knowledge  no deficits    General Tasks and Commands  undertaking multiple tasks    Communication  no deficits    Mobility  lifting and carrying objects  walking  driving (bike, car, motorcycle)    Self care  no deficits    Domestic Life  doing house work (cleaning house, washing dishes, laundry)  assisting others    Interactions/Relationships  no deficits    Life Areas  employment    Community and Social Life  community life  recreation and leisure         moderate   Clinical Presentation evolving clinical presentation with changing clinical characteristics moderate   Decision Making/ Complexity Score: moderate     Goals:  Short Term Goals: 3 weeks:  1. Patient will demonstrate understanding of and compliance with home exercise program.  2. Patient will demonstrate >15 second hold ability with NFET.  3. Patient will report <4/10 cervical pain at worst through the day.     Long Term Goals: 8 weeks:  1. Patient will demonstrate  >30 second hold ability with NFET.  2. Patient will report >10% improvement in YAYO.       Plan   Certification Period: 7/11/2023 to 09/20/2023.    Outpatient Physical Therapy 12 times over the course of 8 weeks to include the following interventions: Cervical/Lumbar Traction, Electrical Stimulation IASTM, Manual Therapy, Moist Heat/ Ice, Neuromuscular Re-ed, Patient Education, Self Care, Therapeutic Activities, and Therapeutic Exercise, Dry Needling.    Andrews Merino, PT, DPT, OCS

## 2023-07-21 ENCOUNTER — CLINICAL SUPPORT (OUTPATIENT)
Dept: REHABILITATION | Facility: HOSPITAL | Age: 63
End: 2023-07-21
Payer: MEDICAID

## 2023-07-21 DIAGNOSIS — M54.2 NECK PAIN: Primary | ICD-10-CM

## 2023-07-21 PROCEDURE — 97110 THERAPEUTIC EXERCISES: CPT | Mod: PN

## 2023-07-21 NOTE — PROGRESS NOTES
"[facility]   Physical Therapy Treatment Note      Name: Cheikh Resendez  Clinic Number: [id number]    Therapy Diagnosis:   Encounter Diagnosis   Name Primary?    Neck pain Yes     Physician: Order, Paper    Visit Date: 7/21/2023    Physician Orders: PT Eval and Treat   Medical Diagnosis:   S12.591D (ICD-10-CM) - Other closed nondisplaced fracture of sixth cervical vertebra with routine healing, subsequent encounter   M54.2 (ICD-10-CM) - Cervicalgia   S12.691D (ICD-10-CM) - Other nondisplaced fracture of seventh cervical vertebra, subsequent encounter for fracture with routine healing      Evaluation Date: 7/11/2023  Authorization Period:  12/31/2023  Plan of Care Certification Period: 7/11/2023 to 09/20/2023  Visit # / Visits authorized: 1/20 FOTO: 2/7  PTA visits: 0/5     Time In: 1200  Time Out: 1255  Total Billable Time: 55 minutes (4 TE-Medicaid)  Precautions: history of traumatic neck injury.     Subjective     Pt reports: for 1st f/u appointment.  Voiced neck pain.  Difficulty sleeping due to neck pain.  He was compliant with home exercise program.  Response to previous treatment: eval and HEP  Functional change: none voiced    Pain: 7/10  Location: right suboccipital area.     Objective      Objective Measures updated at progress report unless specified.     Treatment     Cheikh received the treatments listed below:      Therapeutic exercises to develop strength, endurance, ROM, flexibility, and posture for 55 minutes including:  Manual: gentle cervical traction, suboccipital release (next thoracic mobility)  Cervical isometrics PT 10x10"  Chin tucks 2x10 --> lifts 5x  Supine shoulder wand flexion dA 2x15  Standing cervical stability ball + shoulder flexion 2x10 --> no money's 2x10 red tB       Patient Education and Home Exercises       Education provided:   - home exercise program: cervical isometrics, chin tucks    Written Home Exercises Provided: Patient instructed to cont prior " HEP. Exercises were reviewed and Cheikh was able to demonstrate them prior to the end of the session.  Cheikh demonstrated fair  understanding of the education provided. See EMR under Patient Instructions for exercises provided during therapy sessions    Assessment   A: Cheikh is a 62 y.o. male.  History of mutiple traumatic neck injuries over his life including several years ago sustaining a traumatic fracture of 6th/7th vertebrae.  Latest imaging shows healing of these fractures. History of bilateral shoulder surgery.  Left shoulder with marked weakness. Initiated cervical stabilization and mobilization interventions today; will monitor response.     Cheikh Is progressing well towards his goals.   Pt prognosis is Fair.     Pt will continue to benefit from skilled outpatient physical therapy to address the deficits listed in the problem list box on initial evaluation, provide pt/family education and to maximize pt's level of independence in the home and community environment.   Pt's spiritual, cultural and educational needs considered and pt agreeable to plan of care and goals.     Anticipated barriers to physical therapy: coping style, chronicity, history of multiple neck injuries    Goals:   Short Term Goals: 3 weeks:  progressing towards  1. Patient will demonstrate understanding of and compliance with home exercise program.  2. Patient will demonstrate >15 second hold ability with NFET.  3. Patient will report <4/10 cervical pain at worst through the day.      Long Term Goals: 8 weeks: progressing towards  1. Patient will demonstrate  >30 second hold ability with NFET.  2. Patient will report >10% improvement in YAYO.     Plan   Continue plan of care.  Monitor response to interventions.  Adjust intensity accordingly.     Andrews Merino, PT, DPT, OCS

## 2023-07-24 ENCOUNTER — CLINICAL SUPPORT (OUTPATIENT)
Dept: REHABILITATION | Facility: HOSPITAL | Age: 63
End: 2023-07-24
Payer: MEDICAID

## 2023-07-24 DIAGNOSIS — M54.2 NECK PAIN: Primary | ICD-10-CM

## 2023-07-24 PROCEDURE — 97110 THERAPEUTIC EXERCISES: CPT | Mod: PN,CQ

## 2023-07-24 NOTE — PROGRESS NOTES
"[facility]   Physical Therapy Treatment Note      Name: Cheikh Resendez  Clinic Number: [id number]    Therapy Diagnosis:   Encounter Diagnosis   Name Primary?    Neck pain Yes     Physician: Order, Paper    Visit Date: 7/24/2023    Physician Orders: PT Eval and Treat   Medical Diagnosis:   S12.591D (ICD-10-CM) - Other closed nondisplaced fracture of sixth cervical vertebra with routine healing, subsequent encounter   M54.2 (ICD-10-CM) - Cervicalgia   S12.691D (ICD-10-CM) - Other nondisplaced fracture of seventh cervical vertebra, subsequent encounter for fracture with routine healing      Evaluation Date: 7/11/2023  Authorization Period:  12/31/2023  Plan of Care Certification Period: 7/11/2023 to 09/20/2023  Visit # / Visits authorized: 2/20 FOTO: 3/7  PTA visits: 1/5     Time In: 1210  Time Out: 1255  Total Billable Time: 45 minutes (3 TE-Medicaid)  Precautions: history of traumatic neck injury.     Subjective     Pt reports: States he took pain medication and just woke up so pain is only moderate at the moment. Felt like the exercises help to keep his neck less stiff.  He was compliant with home exercise program.  Response to previous treatment: Felt better  Functional change: none voiced    Pain: 5/10  Location: right suboccipital area.     Objective      Objective Measures updated at progress report unless specified.     Treatment     Cheikh received the treatments listed below:      Therapeutic exercises to develop strength, endurance, ROM, flexibility, and posture for 45 minutes including:  Manual: gentle cervical traction, suboccipital release, AP thoracic mobs  Cervical isometrics PT 10x10"  Chin tucks 2x10 --> lifts 5x  Supine shoulder wand flexion dA 2x15  Standing cervical stability ball + shoulder flexion 2x10 --> no money's 2x10 red tB   [place] x 20 feet OOT      Patient Education and Home Exercises       Education provided:   - home exercise program: cervical isometrics, " "chin tucks    Written Home Exercises Provided: Patient instructed to cont prior HEP. Exercises were reviewed and Cheikh was able to demonstrate them prior to the end of the session.  Cheikh demonstrated fair  understanding of the education provided. See EMR under Patient Instructions for exercises provided during therapy sessions    Assessment   A: Cheikh is a 62 y.o. male.  History of mutiple traumatic neck injuries over his life including several years ago sustaining a traumatic fracture of 6th/7th vertebrae. Latest imaging shows healing of these fractures. History of bilateral shoulder surgery.  Left shoulder with marked weakness.   Today: continued with cervical stabilization and mobilization interventions today. Trial of thoracic AP mobs tolerated well stating it "felt good". Will monitor response. Progress as appropriate.     Cheikh Is progressing well towards his goals.   Pt prognosis is Fair.     Pt will continue to benefit from skilled outpatient physical therapy to address the deficits listed in the problem list box on initial evaluation, provide pt/family education and to maximize pt's level of independence in the home and community environment.   Pt's spiritual, cultural and educational needs considered and pt agreeable to plan of care and goals.     Anticipated barriers to physical therapy: coping style, chronicity, history of multiple neck injuries    Goals:   Short Term Goals: 3 weeks:  progressing towards  1. Patient will demonstrate understanding of and compliance with home exercise program.  2. Patient will demonstrate >15 second hold ability with NFET.  3. Patient will report <4/10 cervical pain at worst through the day.      Long Term Goals: 8 weeks: progressing towards  1. Patient will demonstrate  >30 second hold ability with NFET.  2. Patient will report >10% improvement in YAYO.     Plan   Continue plan of care.  Monitor response to interventions.  Adjust intensity accordingly.     Suzanna SCANLON" Gilbert, PTA

## 2023-07-26 ENCOUNTER — DOCUMENTATION ONLY (OUTPATIENT)
Dept: REHABILITATION | Facility: HOSPITAL | Age: 63
End: 2023-07-26
Payer: MEDICAID

## 2023-07-26 NOTE — PROGRESS NOTES
Face to face meeting completed with Andrews Merino PT regarding current status and progress of   Cheikh Resendez .      Suzanna Hunt, PTA

## 2023-07-31 ENCOUNTER — CLINICAL SUPPORT (OUTPATIENT)
Dept: REHABILITATION | Facility: HOSPITAL | Age: 63
End: 2023-07-31
Payer: MEDICAID

## 2023-07-31 DIAGNOSIS — M54.2 NECK PAIN: Primary | ICD-10-CM

## 2023-07-31 PROCEDURE — 97110 THERAPEUTIC EXERCISES: CPT | Mod: PN

## 2023-07-31 NOTE — PROGRESS NOTES
"[facility]   Physical Therapy Treatment Note      Name: Cheikh Resendez  Clinic Number: [id number]    Therapy Diagnosis:   Encounter Diagnosis   Name Primary?    Neck pain Yes       Physician: Order, Paper    Visit Date: 7/31/2023    Physician Orders: PT Eval and Treat   Medical Diagnosis:   S12.591D (ICD-10-CM) - Other closed nondisplaced fracture of sixth cervical vertebra with routine healing, subsequent encounter   M54.2 (ICD-10-CM) - Cervicalgia   S12.691D (ICD-10-CM) - Other nondisplaced fracture of seventh cervical vertebra, subsequent encounter for fracture with routine healing      Evaluation Date: 7/11/2023  Authorization Period:  12/31/2023  Plan of Care Certification Period: 7/11/2023 to 09/20/2023  Visit # / Visits authorized: 3/20 FOTO: 4/7  PTA visits: 0/5     Time In: 1400  Time Out: 1450  Total Billable Time: 45 minutes (3 TE-Medicaid)  Precautions: history of traumatic neck injury.     Subjective     Pt reports: usual neck pain.  He was compliant with home exercise program.  Response to previous treatment: Felt better  Functional change: none voiced    Pain: 5/10  Location: right suboccipital area.     Objective      Objective Measures updated at progress report unless specified.     Treatment     Cheikh received the treatments listed below:      Therapeutic exercises to develop strength, endurance, ROM, flexibility, and posture for 45 minutes including:  Manual: gentle cervical traction, suboccipital release, AP thoracic mobs  Cervical isometrics PT 10x10"  Chin tucks 2x10 --> lifts 10x  Supine shoulder wand flexion dA 2x15  Standing cervical stability ball + shoulder flexion 2x10 --> no money's 2x15 red tB   Prone scap retractions 10x --> plus shoulder ext 10x --> plus head lifts 10x      Patient Education and Home Exercises       Education provided:   - home exercise program: cervical isometrics, chin tucks    Written Home Exercises Provided: Patient instructed to cont " prior HEP. Exercises were reviewed and Cheikh was able to demonstrate them prior to the end of the session.  Cheikh demonstrated fair  understanding of the education provided. See EMR under Patient Instructions for exercises provided during therapy sessions    Assessment   A: Cheikh is a 62 y.o. male.  History of mutiple traumatic neck injuries over his life including several years ago sustaining a traumatic fracture of 6th/7th vertebrae. Latest imaging shows healing of these fractures. History of bilateral shoulder surgery.  Left shoulder with marked weakness. Continue with cervical stabilization and mobilization interventions including thoracic AP mobs.    Cheikh Is progressing well towards his goals.   Pt prognosis is Fair.     Pt will continue to benefit from skilled outpatient physical therapy to address the deficits listed in the problem list box on initial evaluation, provide pt/family education and to maximize pt's level of independence in the home and community environment.   Pt's spiritual, cultural and educational needs considered and pt agreeable to plan of care and goals.     Anticipated barriers to physical therapy: coping style, chronicity, history of multiple neck injuries    Goals:   Short Term Goals: 3 weeks:  progressing towards  1. Patient will demonstrate understanding of and compliance with home exercise program.  2. Patient will demonstrate >15 second hold ability with NFET.  3. Patient will report <4/10 cervical pain at worst through the day.      Long Term Goals: 8 weeks: progressing towards  1. Patient will demonstrate  >30 second hold ability with NFET.  2. Patient will report >10% improvement in YAYO.     Plan   Cervicoscapular stability  Light RC strengthening.     Andrews Merino, PT, DPT, OCS

## 2023-08-07 ENCOUNTER — CLINICAL SUPPORT (OUTPATIENT)
Dept: REHABILITATION | Facility: HOSPITAL | Age: 63
End: 2023-08-07
Payer: MEDICAID

## 2023-08-07 DIAGNOSIS — M54.2 NECK PAIN: Primary | ICD-10-CM

## 2023-08-07 PROCEDURE — 97110 THERAPEUTIC EXERCISES: CPT | Mod: PN,CQ
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
## 2023-08-11 ENCOUNTER — CLINICAL SUPPORT (OUTPATIENT)
Dept: REHABILITATION | Facility: HOSPITAL | Age: 63
End: 2023-08-11
Payer: MEDICAID

## 2023-08-11 DIAGNOSIS — M54.2 NECK PAIN: Primary | ICD-10-CM

## 2023-08-11 PROCEDURE — 97110 THERAPEUTIC EXERCISES: CPT | Mod: PN,CQ
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
## 2023-08-14 ENCOUNTER — CLINICAL SUPPORT (OUTPATIENT)
Dept: REHABILITATION | Facility: HOSPITAL | Age: 63
End: 2023-08-14
Payer: MEDICAID

## 2023-08-14 DIAGNOSIS — M54.2 NECK PAIN: Primary | ICD-10-CM

## 2023-08-14 PROCEDURE — 97110 THERAPEUTIC EXERCISES: CPT | Mod: PN,CQ
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
## 2023-08-18 ENCOUNTER — CLINICAL SUPPORT (OUTPATIENT)
Dept: REHABILITATION | Facility: HOSPITAL | Age: 63
End: 2023-08-18
Payer: MEDICAID

## 2023-08-18 DIAGNOSIS — M54.2 NECK PAIN: Primary | ICD-10-CM

## 2023-08-18 PROCEDURE — 97110 THERAPEUTIC EXERCISES: CPT | Mod: PN
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
## 2024-12-30 ENCOUNTER — HOSPITAL ENCOUNTER (EMERGENCY)
Facility: HOSPITAL | Age: 64
Discharge: HOME | End: 2024-12-30
Attending: STUDENT IN AN ORGANIZED HEALTH CARE EDUCATION/TRAINING PROGRAM
Payer: COMMERCIAL

## 2024-12-30 VITALS
TEMPERATURE: 98 F | RESPIRATION RATE: 16 BRPM | WEIGHT: 190 LBS | HEART RATE: 82 BPM | OXYGEN SATURATION: 96 % | SYSTOLIC BLOOD PRESSURE: 157 MMHG | HEIGHT: 69 IN | BODY MASS INDEX: 28.14 KG/M2 | DIASTOLIC BLOOD PRESSURE: 84 MMHG
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
## 2024-12-30 DIAGNOSIS — S20.219A CHEST WALL CONTUSION: ICD-10-CM

## 2024-12-30 DIAGNOSIS — S01.01XA LACERATION OF SCALP, INITIAL ENCOUNTER: Primary | ICD-10-CM

## 2024-12-30 DIAGNOSIS — F10.920 ALCOHOLIC INTOXICATION WITHOUT COMPLICATION: ICD-10-CM

## 2024-12-30 LAB
ABO + RH BLD: NORMAL
ALBUMIN SERPL BCP-MCNC: 4.3 G/DL (ref 3.5–5.2)
ALP SERPL-CCNC: 80 U/L (ref 40–150)
ALT SERPL W/O P-5'-P-CCNC: 52 U/L (ref 10–44)
ANION GAP SERPL CALC-SCNC: 13 MMOL/L (ref 8–16)
APTT PPP: 33.6 SEC (ref 21–32)
AST SERPL-CCNC: 41 U/L (ref 10–40)
BASOPHILS # BLD AUTO: 0.03 K/UL (ref 0–0.2)
BASOPHILS NFR BLD: 0.4 % (ref 0–1.9)
BILIRUB SERPL-MCNC: 0.3 MG/DL (ref 0.1–1)
BLD GP AB SCN CELLS X3 SERPL QL: NORMAL
BUN SERPL-MCNC: 14 MG/DL (ref 8–23)
CALCIUM SERPL-MCNC: 9.5 MG/DL (ref 8.7–10.5)
CHLORIDE SERPL-SCNC: 108 MMOL/L (ref 95–110)
CO2 SERPL-SCNC: 20 MMOL/L (ref 23–29)
CREAT SERPL-MCNC: 0.8 MG/DL (ref 0.5–1.4)
DIFFERENTIAL METHOD BLD: NORMAL
EOSINOPHIL # BLD AUTO: 0.1 K/UL (ref 0–0.5)
EOSINOPHIL NFR BLD: 1.8 % (ref 0–8)
ERYTHROCYTE [DISTWIDTH] IN BLOOD BY AUTOMATED COUNT: 13.2 % (ref 11.5–14.5)
EST. GFR  (NO RACE VARIABLE): >60 ML/MIN/1.73 M^2
ETHANOL SERPL-MCNC: 260 MG/DL
GLUCOSE SERPL-MCNC: 109 MG/DL (ref 70–110)
HCT VFR BLD AUTO: 47 % (ref 40–54)
HGB BLD-MCNC: 16.4 G/DL (ref 14–18)
IMM GRANULOCYTES # BLD AUTO: 0.02 K/UL (ref 0–0.04)
IMM GRANULOCYTES NFR BLD AUTO: 0.3 % (ref 0–0.5)
INR PPP: 1 (ref 0.8–1.2)
LYMPHOCYTES # BLD AUTO: 2.7 K/UL (ref 1–4.8)
LYMPHOCYTES NFR BLD: 38.5 % (ref 18–48)
MCH RBC QN AUTO: 30.9 PG (ref 27–31)
MCHC RBC AUTO-ENTMCNC: 34.9 G/DL (ref 32–36)
MCV RBC AUTO: 89 FL (ref 82–98)
MONOCYTES # BLD AUTO: 0.5 K/UL (ref 0.3–1)
MONOCYTES NFR BLD: 6.6 % (ref 4–15)
NEUTROPHILS # BLD AUTO: 3.7 K/UL (ref 1.8–7.7)
NEUTROPHILS NFR BLD: 52.4 % (ref 38–73)
NRBC BLD-RTO: 0 /100 WBC
PLATELET # BLD AUTO: 171 K/UL (ref 150–450)
PMV BLD AUTO: 10.2 FL (ref 9.2–12.9)
POTASSIUM SERPL-SCNC: 4 MMOL/L (ref 3.5–5.1)
PROT SERPL-MCNC: 7.7 G/DL (ref 6–8.4)
PROTHROMBIN TIME: 11 SEC (ref 9–12.5)
RBC # BLD AUTO: 5.31 M/UL (ref 4.6–6.2)
SODIUM SERPL-SCNC: 141 MMOL/L (ref 136–145)
SPECIMEN OUTDATE: NORMAL
WBC # BLD AUTO: 7.09 K/UL (ref 3.9–12.7)

## 2024-12-30 PROCEDURE — 82077 ASSAY SPEC XCP UR&BREATH IA: CPT | Performed by: STUDENT IN AN ORGANIZED HEALTH CARE EDUCATION/TRAINING PROGRAM

## 2024-12-30 PROCEDURE — 80053 COMPREHEN METABOLIC PANEL: CPT | Performed by: STUDENT IN AN ORGANIZED HEALTH CARE EDUCATION/TRAINING PROGRAM

## 2024-12-30 PROCEDURE — 86901 BLOOD TYPING SEROLOGIC RH(D): CPT | Performed by: STUDENT IN AN ORGANIZED HEALTH CARE EDUCATION/TRAINING PROGRAM

## 2024-12-30 PROCEDURE — 85025 COMPLETE CBC W/AUTO DIFF WBC: CPT | Performed by: STUDENT IN AN ORGANIZED HEALTH CARE EDUCATION/TRAINING PROGRAM

## 2024-12-30 PROCEDURE — 99285 EMERGENCY DEPT VISIT HI MDM: CPT | Mod: 25

## 2024-12-30 PROCEDURE — 96374 THER/PROPH/DIAG INJ IV PUSH: CPT

## 2024-12-30 PROCEDURE — 85730 THROMBOPLASTIN TIME PARTIAL: CPT | Performed by: STUDENT IN AN ORGANIZED HEALTH CARE EDUCATION/TRAINING PROGRAM

## 2024-12-30 PROCEDURE — 90471 IMMUNIZATION ADMIN: CPT | Performed by: STUDENT IN AN ORGANIZED HEALTH CARE EDUCATION/TRAINING PROGRAM

## 2024-12-30 PROCEDURE — 63600175 PHARM REV CODE 636 W HCPCS: Performed by: STUDENT IN AN ORGANIZED HEALTH CARE EDUCATION/TRAINING PROGRAM

## 2024-12-30 PROCEDURE — 85610 PROTHROMBIN TIME: CPT | Performed by: STUDENT IN AN ORGANIZED HEALTH CARE EDUCATION/TRAINING PROGRAM

## 2024-12-30 PROCEDURE — 90715 TDAP VACCINE 7 YRS/> IM: CPT | Performed by: STUDENT IN AN ORGANIZED HEALTH CARE EDUCATION/TRAINING PROGRAM

## 2024-12-30 RX ORDER — LIDOCAINE HYDROCHLORIDE 10 MG/ML
5 INJECTION, SOLUTION EPIDURAL; INFILTRATION; INTRACAUDAL; PERINEURAL
Status: COMPLETED | OUTPATIENT
Start: 2024-12-30 | End: 2024-12-30

## 2024-12-30 RX ORDER — THIAMINE HYDROCHLORIDE 100 MG/ML
100 INJECTION, SOLUTION INTRAMUSCULAR; INTRAVENOUS
Status: COMPLETED | OUTPATIENT
Start: 2024-12-30 | End: 2024-12-30

## 2024-12-30 RX ADMIN — LIDOCAINE HYDROCHLORIDE 50 MG: 10 INJECTION, SOLUTION EPIDURAL; INFILTRATION; INTRACAUDAL; PERINEURAL at 05:12

## 2024-12-30 RX ADMIN — THIAMINE HYDROCHLORIDE 100 MG: 100 INJECTION, SOLUTION INTRAMUSCULAR; INTRAVENOUS at 02:12

## 2024-12-30 RX ADMIN — CLOSTRIDIUM TETANI TOXOID ANTIGEN (FORMALDEHYDE INACTIVATED), CORYNEBACTERIUM DIPHTHERIAE TOXOID ANTIGEN (FORMALDEHYDE INACTIVATED), BORDETELLA PERTUSSIS TOXOID ANTIGEN (GLUTARALDEHYDE INACTIVATED), BORDETELLA PERTUSSIS FILAMENTOUS HEMAGGLUTININ ANTIGEN (FORMALDEHYDE INACTIVATED), BORDETELLA PERTUSSIS PERTACTIN ANTIGEN, AND BORDETELLA PERTUSSIS FIMBRIAE 2/3 ANTIGEN 0.5 ML: 5; 2; 2.5; 5; 3; 5 INJECTION, SUSPENSION INTRAMUSCULAR at 02:12
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
## 2024-12-30 NOTE — DISCHARGE INSTRUCTIONS
Patient does not have any emergent medical condition that requires hospitalization or any further emergent treatment, they are medically stable for incarceration.    Patient has 6 staples in their scalp.  These should be removed in 1 week.  This can be done at primary care, a [facility] urgent care, or here in the emergency department, or [facility].  Seek medical attention or Return to the emergency department if patient develops evidence of infection at the scalpel and.
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
